# Patient Record
Sex: MALE | URBAN - METROPOLITAN AREA
[De-identification: names, ages, dates, MRNs, and addresses within clinical notes are randomized per-mention and may not be internally consistent; named-entity substitution may affect disease eponyms.]

---

## 2019-01-01 ENCOUNTER — INPATIENT (INPATIENT)
Facility: HOSPITAL | Age: 59
LOS: 1 days | DRG: 64 | End: 2019-04-11
Attending: INTERNAL MEDICINE | Admitting: INTERNAL MEDICINE
Payer: SELF-PAY

## 2019-01-01 VITALS
OXYGEN SATURATION: 100 % | TEMPERATURE: 99 F | DIASTOLIC BLOOD PRESSURE: 87 MMHG | WEIGHT: 130.07 LBS | RESPIRATION RATE: 17 BRPM | SYSTOLIC BLOOD PRESSURE: 154 MMHG | HEART RATE: 122 BPM | HEIGHT: 62 IN

## 2019-01-01 DIAGNOSIS — I61.3 NONTRAUMATIC INTRACEREBRAL HEMORRHAGE IN BRAIN STEM: ICD-10-CM

## 2019-01-01 DIAGNOSIS — I16.1 HYPERTENSIVE EMERGENCY: ICD-10-CM

## 2019-01-01 DIAGNOSIS — Z51.5 ENCOUNTER FOR PALLIATIVE CARE: ICD-10-CM

## 2019-01-01 DIAGNOSIS — N17.9 ACUTE KIDNEY FAILURE, UNSPECIFIED: ICD-10-CM

## 2019-01-01 DIAGNOSIS — R53.81 OTHER MALAISE: ICD-10-CM

## 2019-01-01 DIAGNOSIS — R50.9 FEVER, UNSPECIFIED: ICD-10-CM

## 2019-01-01 DIAGNOSIS — I61.9 NONTRAUMATIC INTRACEREBRAL HEMORRHAGE, UNSPECIFIED: ICD-10-CM

## 2019-01-01 DIAGNOSIS — Z29.9 ENCOUNTER FOR PROPHYLACTIC MEASURES, UNSPECIFIED: ICD-10-CM

## 2019-01-01 DIAGNOSIS — I10 ESSENTIAL (PRIMARY) HYPERTENSION: ICD-10-CM

## 2019-01-01 DIAGNOSIS — J96.01 ACUTE RESPIRATORY FAILURE WITH HYPOXIA: ICD-10-CM

## 2019-01-01 LAB
ALBUMIN SERPL ELPH-MCNC: 3.4 G/DL — LOW (ref 3.5–5)
ALBUMIN SERPL ELPH-MCNC: 3.7 G/DL — SIGNIFICANT CHANGE UP (ref 3.5–5)
ALP SERPL-CCNC: 112 U/L — SIGNIFICANT CHANGE UP (ref 40–120)
ALP SERPL-CCNC: 128 U/L — HIGH (ref 40–120)
ALT FLD-CCNC: 25 U/L DA — SIGNIFICANT CHANGE UP (ref 10–60)
ALT FLD-CCNC: 30 U/L DA — SIGNIFICANT CHANGE UP (ref 10–60)
ANION GAP SERPL CALC-SCNC: 12 MMOL/L — SIGNIFICANT CHANGE UP (ref 5–17)
ANION GAP SERPL CALC-SCNC: 13 MMOL/L — SIGNIFICANT CHANGE UP (ref 5–17)
ANION GAP SERPL CALC-SCNC: 5 MMOL/L — SIGNIFICANT CHANGE UP (ref 5–17)
ANION GAP SERPL CALC-SCNC: 9 MMOL/L — SIGNIFICANT CHANGE UP (ref 5–17)
APTT BLD: 34.6 SEC — SIGNIFICANT CHANGE UP (ref 27.5–36.3)
AST SERPL-CCNC: 46 U/L — HIGH (ref 10–40)
AST SERPL-CCNC: 53 U/L — HIGH (ref 10–40)
BASE EXCESS BLDA CALC-SCNC: 1.7 MMOL/L — SIGNIFICANT CHANGE UP (ref -2–2)
BASOPHILS # BLD AUTO: 0.03 K/UL — SIGNIFICANT CHANGE UP (ref 0–0.2)
BASOPHILS # BLD AUTO: 0.04 K/UL — SIGNIFICANT CHANGE UP (ref 0–0.2)
BASOPHILS NFR BLD AUTO: 0.2 % — SIGNIFICANT CHANGE UP (ref 0–2)
BASOPHILS NFR BLD AUTO: 0.3 % — SIGNIFICANT CHANGE UP (ref 0–2)
BILIRUB DIRECT SERPL-MCNC: 0.1 MG/DL — SIGNIFICANT CHANGE UP (ref 0–0.2)
BILIRUB INDIRECT FLD-MCNC: 0.3 MG/DL — SIGNIFICANT CHANGE UP (ref 0.2–1)
BILIRUB SERPL-MCNC: 0.3 MG/DL — SIGNIFICANT CHANGE UP (ref 0.2–1.2)
BILIRUB SERPL-MCNC: 0.4 MG/DL — SIGNIFICANT CHANGE UP (ref 0.2–1.2)
BLOOD GAS COMMENTS ARTERIAL: SIGNIFICANT CHANGE UP
BUN SERPL-MCNC: 18 MG/DL — SIGNIFICANT CHANGE UP (ref 7–18)
BUN SERPL-MCNC: 19 MG/DL — HIGH (ref 7–18)
BUN SERPL-MCNC: 20 MG/DL — HIGH (ref 7–18)
BUN SERPL-MCNC: 30 MG/DL — HIGH (ref 7–18)
CALCIUM SERPL-MCNC: 7.8 MG/DL — LOW (ref 8.4–10.5)
CALCIUM SERPL-MCNC: 7.9 MG/DL — LOW (ref 8.4–10.5)
CALCIUM SERPL-MCNC: 8.4 MG/DL — SIGNIFICANT CHANGE UP (ref 8.4–10.5)
CALCIUM SERPL-MCNC: 8.4 MG/DL — SIGNIFICANT CHANGE UP (ref 8.4–10.5)
CHLORIDE SERPL-SCNC: 103 MMOL/L — SIGNIFICANT CHANGE UP (ref 96–108)
CHLORIDE SERPL-SCNC: 107 MMOL/L — SIGNIFICANT CHANGE UP (ref 96–108)
CHLORIDE SERPL-SCNC: 111 MMOL/L — HIGH (ref 96–108)
CHLORIDE SERPL-SCNC: 112 MMOL/L — HIGH (ref 96–108)
CK MB BLD-MCNC: 3.2 % — SIGNIFICANT CHANGE UP (ref 0–3.5)
CK MB CFR SERPL CALC: 3.4 NG/ML — SIGNIFICANT CHANGE UP (ref 0–3.6)
CK SERPL-CCNC: 105 U/L — SIGNIFICANT CHANGE UP (ref 35–232)
CO2 SERPL-SCNC: 22 MMOL/L — SIGNIFICANT CHANGE UP (ref 22–31)
CO2 SERPL-SCNC: 22 MMOL/L — SIGNIFICANT CHANGE UP (ref 22–31)
CO2 SERPL-SCNC: 25 MMOL/L — SIGNIFICANT CHANGE UP (ref 22–31)
CO2 SERPL-SCNC: 28 MMOL/L — SIGNIFICANT CHANGE UP (ref 22–31)
CREAT SERPL-MCNC: 1.3 MG/DL — SIGNIFICANT CHANGE UP (ref 0.5–1.3)
CREAT SERPL-MCNC: 1.45 MG/DL — HIGH (ref 0.5–1.3)
CREAT SERPL-MCNC: 1.48 MG/DL — HIGH (ref 0.5–1.3)
CREAT SERPL-MCNC: 1.78 MG/DL — HIGH (ref 0.5–1.3)
EOSINOPHIL # BLD AUTO: 0 K/UL — SIGNIFICANT CHANGE UP (ref 0–0.5)
EOSINOPHIL # BLD AUTO: 0.01 K/UL — SIGNIFICANT CHANGE UP (ref 0–0.5)
EOSINOPHIL NFR BLD AUTO: 0 % — SIGNIFICANT CHANGE UP (ref 0–6)
EOSINOPHIL NFR BLD AUTO: 0.1 % — SIGNIFICANT CHANGE UP (ref 0–6)
GLUCOSE SERPL-MCNC: 120 MG/DL — HIGH (ref 70–99)
GLUCOSE SERPL-MCNC: 166 MG/DL — HIGH (ref 70–99)
GLUCOSE SERPL-MCNC: 190 MG/DL — HIGH (ref 70–99)
GLUCOSE SERPL-MCNC: 324 MG/DL — HIGH (ref 70–99)
HCO3 BLDA-SCNC: 25 MMOL/L — SIGNIFICANT CHANGE UP (ref 23–27)
HCT VFR BLD CALC: 46.9 % — SIGNIFICANT CHANGE UP (ref 39–50)
HCT VFR BLD CALC: 49.5 % — SIGNIFICANT CHANGE UP (ref 39–50)
HCT VFR BLD CALC: 50.9 % — HIGH (ref 39–50)
HCT VFR BLD CALC: 52.1 % — HIGH (ref 39–50)
HGB BLD-MCNC: 14.9 G/DL — SIGNIFICANT CHANGE UP (ref 13–17)
HGB BLD-MCNC: 15 G/DL — SIGNIFICANT CHANGE UP (ref 13–17)
HGB BLD-MCNC: 15.9 G/DL — SIGNIFICANT CHANGE UP (ref 13–17)
HGB BLD-MCNC: 16.6 G/DL — SIGNIFICANT CHANGE UP (ref 13–17)
HOROWITZ INDEX BLDA+IHG-RTO: 50 — SIGNIFICANT CHANGE UP
IMM GRANULOCYTES NFR BLD AUTO: 0.4 % — SIGNIFICANT CHANGE UP (ref 0–1.5)
IMM GRANULOCYTES NFR BLD AUTO: 0.6 % — SIGNIFICANT CHANGE UP (ref 0–1.5)
INR BLD: 0.97 RATIO — SIGNIFICANT CHANGE UP (ref 0.88–1.16)
LACTATE SERPL-SCNC: 2.4 MMOL/L — HIGH (ref 0.7–2)
LYMPHOCYTES # BLD AUTO: 0.84 K/UL — LOW (ref 1–3.3)
LYMPHOCYTES # BLD AUTO: 1.08 K/UL — SIGNIFICANT CHANGE UP (ref 1–3.3)
LYMPHOCYTES # BLD AUTO: 6.7 % — LOW (ref 13–44)
LYMPHOCYTES # BLD AUTO: 7.9 % — LOW (ref 13–44)
MAGNESIUM SERPL-MCNC: 2.4 MG/DL — SIGNIFICANT CHANGE UP (ref 1.6–2.6)
MAGNESIUM SERPL-MCNC: 2.5 MG/DL — SIGNIFICANT CHANGE UP (ref 1.6–2.6)
MCHC RBC-ENTMCNC: 25.7 PG — LOW (ref 27–34)
MCHC RBC-ENTMCNC: 25.9 PG — LOW (ref 27–34)
MCHC RBC-ENTMCNC: 30.1 GM/DL — LOW (ref 32–36)
MCHC RBC-ENTMCNC: 31.2 GM/DL — LOW (ref 32–36)
MCHC RBC-ENTMCNC: 31.9 GM/DL — LOW (ref 32–36)
MCHC RBC-ENTMCNC: 32 GM/DL — SIGNIFICANT CHANGE UP (ref 32–36)
MCV RBC AUTO: 81 FL — SIGNIFICANT CHANGE UP (ref 80–100)
MCV RBC AUTO: 81.2 FL — SIGNIFICANT CHANGE UP (ref 80–100)
MCV RBC AUTO: 82.4 FL — SIGNIFICANT CHANGE UP (ref 80–100)
MCV RBC AUTO: 86.1 FL — SIGNIFICANT CHANGE UP (ref 80–100)
MONOCYTES # BLD AUTO: 0.98 K/UL — HIGH (ref 0–0.9)
MONOCYTES # BLD AUTO: 1.43 K/UL — HIGH (ref 0–0.9)
MONOCYTES NFR BLD AUTO: 10.5 % — SIGNIFICANT CHANGE UP (ref 2–14)
MONOCYTES NFR BLD AUTO: 7.8 % — SIGNIFICANT CHANGE UP (ref 2–14)
NEUTROPHILS # BLD AUTO: 10.58 K/UL — HIGH (ref 1.8–7.4)
NEUTROPHILS # BLD AUTO: 11 K/UL — HIGH (ref 1.8–7.4)
NEUTROPHILS NFR BLD AUTO: 80.8 % — HIGH (ref 43–77)
NEUTROPHILS NFR BLD AUTO: 84.7 % — HIGH (ref 43–77)
NRBC # BLD: 0 /100 WBCS — SIGNIFICANT CHANGE UP (ref 0–0)
PCO2 BLDA: 39 MMHG — SIGNIFICANT CHANGE UP (ref 32–46)
PCP SPEC-MCNC: SIGNIFICANT CHANGE UP
PH BLDA: 7.43 — SIGNIFICANT CHANGE UP (ref 7.35–7.45)
PHOSPHATE SERPL-MCNC: 3.1 MG/DL — SIGNIFICANT CHANGE UP (ref 2.5–4.5)
PHOSPHATE SERPL-MCNC: 4.4 MG/DL — SIGNIFICANT CHANGE UP (ref 2.5–4.5)
PLATELET # BLD AUTO: 246 K/UL — SIGNIFICANT CHANGE UP (ref 150–400)
PLATELET # BLD AUTO: 253 K/UL — SIGNIFICANT CHANGE UP (ref 150–400)
PLATELET # BLD AUTO: 253 K/UL — SIGNIFICANT CHANGE UP (ref 150–400)
PLATELET # BLD AUTO: 264 K/UL — SIGNIFICANT CHANGE UP (ref 150–400)
PO2 BLDA: 151 MMHG — HIGH (ref 74–108)
POTASSIUM SERPL-MCNC: 3.6 MMOL/L — SIGNIFICANT CHANGE UP (ref 3.5–5.3)
POTASSIUM SERPL-MCNC: 3.6 MMOL/L — SIGNIFICANT CHANGE UP (ref 3.5–5.3)
POTASSIUM SERPL-MCNC: 3.7 MMOL/L — SIGNIFICANT CHANGE UP (ref 3.5–5.3)
POTASSIUM SERPL-MCNC: 3.7 MMOL/L — SIGNIFICANT CHANGE UP (ref 3.5–5.3)
POTASSIUM SERPL-SCNC: 3.6 MMOL/L — SIGNIFICANT CHANGE UP (ref 3.5–5.3)
POTASSIUM SERPL-SCNC: 3.6 MMOL/L — SIGNIFICANT CHANGE UP (ref 3.5–5.3)
POTASSIUM SERPL-SCNC: 3.7 MMOL/L — SIGNIFICANT CHANGE UP (ref 3.5–5.3)
POTASSIUM SERPL-SCNC: 3.7 MMOL/L — SIGNIFICANT CHANGE UP (ref 3.5–5.3)
PROT SERPL-MCNC: 7.1 G/DL — SIGNIFICANT CHANGE UP (ref 6–8.3)
PROT SERPL-MCNC: 7.5 G/DL — SIGNIFICANT CHANGE UP (ref 6–8.3)
PROTHROM AB SERPL-ACNC: 10.7 SEC — SIGNIFICANT CHANGE UP (ref 10–12.9)
RBC # BLD: 5.75 M/UL — SIGNIFICANT CHANGE UP (ref 4.2–5.8)
RBC # BLD: 5.79 M/UL — SIGNIFICANT CHANGE UP (ref 4.2–5.8)
RBC # BLD: 6.18 M/UL — HIGH (ref 4.2–5.8)
RBC # BLD: 6.42 M/UL — HIGH (ref 4.2–5.8)
RBC # FLD: 13.2 % — SIGNIFICANT CHANGE UP (ref 10.3–14.5)
RBC # FLD: 13.2 % — SIGNIFICANT CHANGE UP (ref 10.3–14.5)
RBC # FLD: 13.4 % — SIGNIFICANT CHANGE UP (ref 10.3–14.5)
RBC # FLD: 13.7 % — SIGNIFICANT CHANGE UP (ref 10.3–14.5)
SAO2 % BLDA: 99 % — HIGH (ref 92–96)
SODIUM SERPL-SCNC: 138 MMOL/L — SIGNIFICANT CHANGE UP (ref 135–145)
SODIUM SERPL-SCNC: 141 MMOL/L — SIGNIFICANT CHANGE UP (ref 135–145)
SODIUM SERPL-SCNC: 144 MMOL/L — SIGNIFICANT CHANGE UP (ref 135–145)
SODIUM SERPL-SCNC: 146 MMOL/L — HIGH (ref 135–145)
TROPONIN I SERPL-MCNC: 0.07 NG/ML — HIGH (ref 0–0.04)
TROPONIN I SERPL-MCNC: 0.56 NG/ML — HIGH (ref 0–0.04)
WBC # BLD: 10.22 K/UL — SIGNIFICANT CHANGE UP (ref 3.8–10.5)
WBC # BLD: 12.5 K/UL — HIGH (ref 3.8–10.5)
WBC # BLD: 13.62 K/UL — HIGH (ref 3.8–10.5)
WBC # BLD: 14.06 K/UL — HIGH (ref 3.8–10.5)
WBC # FLD AUTO: 10.22 K/UL — SIGNIFICANT CHANGE UP (ref 3.8–10.5)
WBC # FLD AUTO: 12.5 K/UL — HIGH (ref 3.8–10.5)
WBC # FLD AUTO: 13.62 K/UL — HIGH (ref 3.8–10.5)
WBC # FLD AUTO: 14.06 K/UL — HIGH (ref 3.8–10.5)

## 2019-01-01 PROCEDURE — 80053 COMPREHEN METABOLIC PANEL: CPT

## 2019-01-01 PROCEDURE — 85730 THROMBOPLASTIN TIME PARTIAL: CPT

## 2019-01-01 PROCEDURE — 82553 CREATINE MB FRACTION: CPT

## 2019-01-01 PROCEDURE — 85610 PROTHROMBIN TIME: CPT

## 2019-01-01 PROCEDURE — 99291 CRITICAL CARE FIRST HOUR: CPT

## 2019-01-01 PROCEDURE — 83735 ASSAY OF MAGNESIUM: CPT

## 2019-01-01 PROCEDURE — 94003 VENT MGMT INPAT SUBQ DAY: CPT

## 2019-01-01 PROCEDURE — 85027 COMPLETE CBC AUTOMATED: CPT

## 2019-01-01 PROCEDURE — 80076 HEPATIC FUNCTION PANEL: CPT

## 2019-01-01 PROCEDURE — 70450 CT HEAD/BRAIN W/O DYE: CPT | Mod: 26

## 2019-01-01 PROCEDURE — 93005 ELECTROCARDIOGRAM TRACING: CPT

## 2019-01-01 PROCEDURE — 96374 THER/PROPH/DIAG INJ IV PUSH: CPT | Mod: XU

## 2019-01-01 PROCEDURE — 80048 BASIC METABOLIC PNL TOTAL CA: CPT

## 2019-01-01 PROCEDURE — 84100 ASSAY OF PHOSPHORUS: CPT

## 2019-01-01 PROCEDURE — 99291 CRITICAL CARE FIRST HOUR: CPT | Mod: 25

## 2019-01-01 PROCEDURE — 94760 N-INVAS EAR/PLS OXIMETRY 1: CPT

## 2019-01-01 PROCEDURE — 82803 BLOOD GASES ANY COMBINATION: CPT

## 2019-01-01 PROCEDURE — 82962 GLUCOSE BLOOD TEST: CPT

## 2019-01-01 PROCEDURE — 94002 VENT MGMT INPAT INIT DAY: CPT

## 2019-01-01 PROCEDURE — 71045 X-RAY EXAM CHEST 1 VIEW: CPT | Mod: 26

## 2019-01-01 PROCEDURE — 83605 ASSAY OF LACTIC ACID: CPT

## 2019-01-01 PROCEDURE — 99223 1ST HOSP IP/OBS HIGH 75: CPT

## 2019-01-01 PROCEDURE — 70450 CT HEAD/BRAIN W/O DYE: CPT

## 2019-01-01 PROCEDURE — 36415 COLL VENOUS BLD VENIPUNCTURE: CPT

## 2019-01-01 PROCEDURE — 71045 X-RAY EXAM CHEST 1 VIEW: CPT

## 2019-01-01 PROCEDURE — 99497 ADVNCD CARE PLAN 30 MIN: CPT | Mod: 25

## 2019-01-01 PROCEDURE — 82550 ASSAY OF CK (CPK): CPT

## 2019-01-01 PROCEDURE — 84484 ASSAY OF TROPONIN QUANT: CPT

## 2019-01-01 PROCEDURE — 80307 DRUG TEST PRSMV CHEM ANLYZR: CPT

## 2019-01-01 RX ORDER — MORPHINE SULFATE 50 MG/1
2 CAPSULE, EXTENDED RELEASE ORAL EVERY 4 HOURS
Qty: 0 | Refills: 0 | Status: DISCONTINUED | OUTPATIENT
Start: 2019-01-01 | End: 2019-01-01

## 2019-01-01 RX ORDER — DEXTROSE 50 % IN WATER 50 %
25 SYRINGE (ML) INTRAVENOUS ONCE
Qty: 0 | Refills: 0 | Status: DISCONTINUED | OUTPATIENT
Start: 2019-01-01 | End: 2019-01-01

## 2019-01-01 RX ORDER — SODIUM CHLORIDE 9 MG/ML
1000 INJECTION, SOLUTION INTRAVENOUS
Qty: 0 | Refills: 0 | Status: DISCONTINUED | OUTPATIENT
Start: 2019-01-01 | End: 2019-01-01

## 2019-01-01 RX ORDER — ROBINUL 0.2 MG/ML
0.2 INJECTION INTRAMUSCULAR; INTRAVENOUS EVERY 6 HOURS
Qty: 0 | Refills: 0 | Status: DISCONTINUED | OUTPATIENT
Start: 2019-01-01 | End: 2019-01-01

## 2019-01-01 RX ORDER — ACETAMINOPHEN 500 MG
2 TABLET ORAL
Qty: 20 | Refills: 0 | OUTPATIENT
Start: 2019-01-01

## 2019-01-01 RX ORDER — ACETAMINOPHEN 500 MG
1000 TABLET ORAL ONCE
Qty: 0 | Refills: 0 | Status: COMPLETED | OUTPATIENT
Start: 2019-01-01 | End: 2019-01-01

## 2019-01-01 RX ORDER — GLUCAGON INJECTION, SOLUTION 0.5 MG/.1ML
1 INJECTION, SOLUTION SUBCUTANEOUS ONCE
Qty: 0 | Refills: 0 | Status: DISCONTINUED | OUTPATIENT
Start: 2019-01-01 | End: 2019-01-01

## 2019-01-01 RX ORDER — CHLORHEXIDINE GLUCONATE 213 G/1000ML
1 SOLUTION TOPICAL
Qty: 0 | Refills: 0 | Status: DISCONTINUED | OUTPATIENT
Start: 2019-01-01 | End: 2019-01-01

## 2019-01-01 RX ORDER — DEXTROSE 50 % IN WATER 50 %
15 SYRINGE (ML) INTRAVENOUS ONCE
Qty: 0 | Refills: 0 | Status: DISCONTINUED | OUTPATIENT
Start: 2019-01-01 | End: 2019-01-01

## 2019-01-01 RX ORDER — MORPHINE SULFATE 50 MG/1
2 CAPSULE, EXTENDED RELEASE ORAL
Qty: 0 | Refills: 0 | Status: DISCONTINUED | OUTPATIENT
Start: 2019-01-01 | End: 2019-01-01

## 2019-01-01 RX ORDER — LISINOPRIL 2.5 MG/1
0 TABLET ORAL
Qty: 0 | Refills: 0 | COMMUNITY

## 2019-01-01 RX ORDER — FENTANYL CITRATE 50 UG/ML
50 INJECTION INTRAVENOUS ONCE
Qty: 0 | Refills: 0 | Status: DISCONTINUED | OUTPATIENT
Start: 2019-01-01 | End: 2019-01-01

## 2019-01-01 RX ORDER — ROBINUL 0.2 MG/ML
0.4 INJECTION INTRAMUSCULAR; INTRAVENOUS EVERY 6 HOURS
Qty: 0 | Refills: 0 | Status: DISCONTINUED | OUTPATIENT
Start: 2019-01-01 | End: 2019-01-01

## 2019-01-01 RX ORDER — FENTANYL CITRATE 50 UG/ML
50 INJECTION INTRAVENOUS
Qty: 0 | Refills: 0 | Status: DISCONTINUED | OUTPATIENT
Start: 2019-01-01 | End: 2019-01-01

## 2019-01-01 RX ORDER — FENTANYL CITRATE 50 UG/ML
100 INJECTION INTRAVENOUS ONCE
Qty: 0 | Refills: 0 | Status: DISCONTINUED | OUTPATIENT
Start: 2019-01-01 | End: 2019-01-01

## 2019-01-01 RX ORDER — INSULIN LISPRO 100/ML
VIAL (ML) SUBCUTANEOUS EVERY 6 HOURS
Qty: 0 | Refills: 0 | Status: DISCONTINUED | OUTPATIENT
Start: 2019-01-01 | End: 2019-01-01

## 2019-01-01 RX ORDER — DOCUSATE SODIUM 100 MG
0 CAPSULE ORAL
Qty: 0 | Refills: 0 | COMMUNITY

## 2019-01-01 RX ORDER — NICARDIPINE HYDROCHLORIDE 30 MG/1
5 CAPSULE, EXTENDED RELEASE ORAL
Qty: 40 | Refills: 0 | Status: DISCONTINUED | OUTPATIENT
Start: 2019-01-01 | End: 2019-01-01

## 2019-01-01 RX ORDER — LEVETIRACETAM 250 MG/1
1000 TABLET, FILM COATED ORAL ONCE
Qty: 0 | Refills: 0 | Status: COMPLETED | OUTPATIENT
Start: 2019-01-01 | End: 2019-01-01

## 2019-01-01 RX ORDER — PANTOPRAZOLE SODIUM 20 MG/1
40 TABLET, DELAYED RELEASE ORAL DAILY
Qty: 0 | Refills: 0 | Status: DISCONTINUED | OUTPATIENT
Start: 2019-01-01 | End: 2019-01-01

## 2019-01-01 RX ORDER — DEXTROSE 50 % IN WATER 50 %
12.5 SYRINGE (ML) INTRAVENOUS ONCE
Qty: 0 | Refills: 0 | Status: DISCONTINUED | OUTPATIENT
Start: 2019-01-01 | End: 2019-01-01

## 2019-01-01 RX ORDER — ACETAMINOPHEN 500 MG
1000 TABLET ORAL ONCE
Qty: 0 | Refills: 0 | Status: DISCONTINUED | OUTPATIENT
Start: 2019-01-01 | End: 2019-01-01

## 2019-01-01 RX ORDER — ACETAMINOPHEN 500 MG
650 TABLET ORAL EVERY 6 HOURS
Qty: 0 | Refills: 0 | Status: DISCONTINUED | OUTPATIENT
Start: 2019-01-01 | End: 2019-01-01

## 2019-01-01 RX ORDER — ASPIRIN/CALCIUM CARB/MAGNESIUM 324 MG
0 TABLET ORAL
Qty: 0 | Refills: 0 | COMMUNITY

## 2019-01-01 RX ORDER — OMEPRAZOLE 10 MG/1
0 CAPSULE, DELAYED RELEASE ORAL
Qty: 0 | Refills: 0 | COMMUNITY

## 2019-01-01 RX ADMIN — FENTANYL CITRATE 50 MICROGRAM(S): 50 INJECTION INTRAVENOUS at 09:35

## 2019-01-01 RX ADMIN — FENTANYL CITRATE 50 MICROGRAM(S): 50 INJECTION INTRAVENOUS at 13:15

## 2019-01-01 RX ADMIN — FENTANYL CITRATE 50 MICROGRAM(S): 50 INJECTION INTRAVENOUS at 00:25

## 2019-01-01 RX ADMIN — FENTANYL CITRATE 50 MICROGRAM(S): 50 INJECTION INTRAVENOUS at 15:27

## 2019-01-01 RX ADMIN — SODIUM CHLORIDE 30 MILLILITER(S): 9 INJECTION, SOLUTION INTRAVENOUS at 02:04

## 2019-01-01 RX ADMIN — CHLORHEXIDINE GLUCONATE 1 APPLICATION(S): 213 SOLUTION TOPICAL at 18:27

## 2019-01-01 RX ADMIN — LEVETIRACETAM 400 MILLIGRAM(S): 250 TABLET, FILM COATED ORAL at 02:37

## 2019-01-01 RX ADMIN — NICARDIPINE HYDROCHLORIDE 25 MG/HR: 30 CAPSULE, EXTENDED RELEASE ORAL at 11:00

## 2019-01-01 RX ADMIN — FENTANYL CITRATE 50 MICROGRAM(S): 50 INJECTION INTRAVENOUS at 00:08

## 2019-01-01 RX ADMIN — MORPHINE SULFATE 2 MILLIGRAM(S): 50 CAPSULE, EXTENDED RELEASE ORAL at 13:46

## 2019-01-01 RX ADMIN — Medication 400 MILLIGRAM(S): at 21:32

## 2019-01-01 RX ADMIN — FENTANYL CITRATE 50 MICROGRAM(S): 50 INJECTION INTRAVENOUS at 14:57

## 2019-01-01 RX ADMIN — FENTANYL CITRATE 100 MICROGRAM(S): 50 INJECTION INTRAVENOUS at 18:37

## 2019-01-01 RX ADMIN — ROBINUL 0.2 MILLIGRAM(S): 0.2 INJECTION INTRAMUSCULAR; INTRAVENOUS at 13:45

## 2019-01-01 RX ADMIN — Medication 1: at 06:09

## 2019-01-01 RX ADMIN — MORPHINE SULFATE 2 MILLIGRAM(S): 50 CAPSULE, EXTENDED RELEASE ORAL at 13:18

## 2019-01-01 RX ADMIN — Medication 400 MILLIGRAM(S): at 07:16

## 2019-01-01 RX ADMIN — Medication 1000 MILLIGRAM(S): at 07:30

## 2019-01-01 RX ADMIN — NICARDIPINE HYDROCHLORIDE 25 MG/HR: 30 CAPSULE, EXTENDED RELEASE ORAL at 02:21

## 2019-01-01 RX ADMIN — FENTANYL CITRATE 50 MICROGRAM(S): 50 INJECTION INTRAVENOUS at 09:05

## 2019-01-01 RX ADMIN — Medication 1000 MILLIGRAM(S): at 22:15

## 2019-01-01 RX ADMIN — PANTOPRAZOLE SODIUM 40 MILLIGRAM(S): 20 TABLET, DELAYED RELEASE ORAL at 13:05

## 2019-01-01 RX ADMIN — FENTANYL CITRATE 50 MICROGRAM(S): 50 INJECTION INTRAVENOUS at 12:38

## 2019-01-01 RX ADMIN — NICARDIPINE HYDROCHLORIDE 25 MG/HR: 30 CAPSULE, EXTENDED RELEASE ORAL at 00:00

## 2019-01-01 RX ADMIN — CHLORHEXIDINE GLUCONATE 1 APPLICATION(S): 213 SOLUTION TOPICAL at 06:03

## 2019-01-01 RX ADMIN — CHLORHEXIDINE GLUCONATE 1 APPLICATION(S): 213 SOLUTION TOPICAL at 17:22

## 2019-01-01 RX ADMIN — CHLORHEXIDINE GLUCONATE 1 APPLICATION(S): 213 SOLUTION TOPICAL at 06:33

## 2019-04-09 NOTE — PROGRESS NOTE ADULT - PROBLEM SELECTOR PLAN 1
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   - Family leaning towards withdrawal of care, needs time to discuss with other family members  - no asa / blood thinners  - will monitor BP.

## 2019-04-09 NOTE — H&P ADULT - ATTENDING COMMENTS
Patient seen and examined at bedside with resident in the ED upon consultation request at 1.50AM.  This is 59 year old man with uncontrolled HTN found with unresponsiveness and intubated in the field and CT brain in the ED showed pontine/brainstem bleed. He was started on nicardipine for BP control. Informed partner at bedside of poor prognosis.  VS: 's/90's mmHg (on cardene drip), middle aged man orally intubated, unresponsive, intermittent jerky movements of lower extremities, pupils dilated and fixed, no corneal or gag reflexes, +cough, Lungs clear,   LABS reviewed. CT brain showed massive brainstem bleed.  A/P  Massive pontine/brainstem bleed with impending herniation with acute respiratory failure s/p intubation.  Hypertensive emergency.   - Continue mechanical ventilation  -Continue Nicardipine drip to keep SBP ~140mmHg.  -Start 3% saline to keep serum Na 145-150.  -Neurosurgery contacted by ED physician and no intervention, poor prognosis.  -[facility] to be contacted.  -Palliative care consult.  -DVT prophylaxis with venodynes, no lovenox in view of ICH.  -GI prophylaxis.  -D/w patient's partner at bedside and informed her of grave prognosis.   -Accepted to ICU.

## 2019-04-09 NOTE — CONSULT NOTE ADULT - SUBJECTIVE AND OBJECTIVE BOX
Patient is a 59y old  Male who presents with a chief complaint of unresponsiveness (09 Apr 2019 08:02)      HPI:  59  year  old male with PMH of HTN , GERD presented to  ED brought by  911 after being intubated in the [facility] for unresponsiveness and agonal  breathing . patient partner at  the bedside provides the history . patient was watching the basket ball  game and all  of a sudden started moaning and then lost consiousness . patient non complaint with  b. 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 Smaller foci of right   cerebellar hemorrhage. Prominent temporal horns. Early hydrocephalus   cannot be excluded. (09 Apr 2019 03:23)         Neurological Review of Systems:  No difficulty with language.  No vision loss or double vision.  No dizziness, vertigo or new hearing loss.  No difficulty with speech or swallowing.  No focal weakness.  No focal sensory changes.  No numbness or tingling in the bilateral lower extremities.  No difficulty with balance.  No difficulty with ambulation.        MEDICATIONS  (STANDING):  dextrose 50% Injectable 25 Gram(s) IV Push once  insulin lispro (HumaLOG) corrective regimen sliding scale   SubCutaneous every 6 hours  niCARdipine Infusion 5 mG/Hr (25 mL/Hr) IV Continuous <Continuous>  pantoprazole  Injectable 40 milliGRAM(s) IV Push daily    MEDICATIONS  (PRN):  glucagon  Injectable 1 milliGRAM(s) IntraMuscular once PRN Glucose <70 milliGRAM(s)/deciLiter    Allergies    No Known Allergies    Intolerances      PAST MEDICAL & SURGICAL HISTORY:  GERD (gastroesophageal reflux disease)  HTN (hypertension)  No significant past surgical history    FAMILY HISTORY:    SOCIAL HISTORY: non smoker/ former smoker/ active smoker    Review of Systems:  Constitutional: No generalized weakness. No fevers or chills.                    Eyes, Ears, Mouth, Throat: No vision loss   Respiratory: No shortness of breath or cough.                                Cardiovascular: No chest pain or palpitations  Gastrointestinal: No nausea or vomiting.                                         Genitourinary: No urinary incontinence or burning on urination.  Musculoskeletal: No joint pain.                                                           Dermatologic: No rash.  Neurological: as per HPI                                                                      Psychiatric: No behavioral problems.  Endocrine: No known hypoglycemia.               Hematologic/Lymphatic: No easy bleeding.    O:  Vital Signs Last 24 Hrs  T(C): 39.7 (09 Apr 2019 07:30), Max: 39.7 (09 Apr 2019 07:30)  T(F): 103.4 (09 Apr 2019 07:30), Max: 103.4 (09 Apr 2019 07:30)  HR: 117 (09 Apr 2019 08:00) (101 - 122)  BP: 145/86 (09 Apr 2019 08:00) (123/68 - 185/110)  BP(mean): 99 (09 Apr 2019 08:00) (86 - 102)  RR: 28 (09 Apr 2019 08:00) (14 - 28)  SpO2: 99% (09 Apr 2019 08:00) (99% - 100%)    General Exam:   General appearance: No acute distress                 Cardiovascular: Pedal dorsalis pulses intact bilaterally    Mental Status: Orientated to self, date and place.  Attention intact.  No dysarthria, aphasia or neglect.  Knowledge intact.  Registration intact.  Short and long term memory grossly intact.      Cranial Nerves: CN I - not tested.  PERRL, EOMI, VFF, no nystagmus or diplopia.  No APD.  Fundi not visualized.  CN V1-3 intact to light touch and pinprick.  No facial asymmetry.  Hearing intact to finger rub bilaterally.  Tongue, uvula and palate midline.  Sternocleidomastoid and Trapezius intact bilaterally.    Motor:   Tone: normal.                  Strength intact throughout  No pronator drift bilaterally                      No dysmetria on finger-nose-finger or heel-shin-heel  No truncal ataxia.  No resting, postural or action tremor.  No myoclonus.    Sensation: intact to light touch, pinprick, vibration and proprioception    Deep Tendon Reflexes: 1+ bilateral biceps, triceps, brachioradialis, knee and ankle  Toes flexor bilaterally    Gait: normal and stable.  Rhomberg -sanjana.    Other:     LABS:                        15.0   14.06 )-----------( 253      ( 09 Apr 2019 05:17 )             46.9     04-09    141  |  107  |  20<H>  ----------------------------<  166<H>  3.6   |  25  |  1.48<H>    Ca    7.9<L>      09 Apr 2019 05:17    TPro  7.5  /  Alb  3.7  /  TBili  0.4  /  DBili  0.1  /  AST  46<H>  /  ALT  30  /  AlkPhos  112  04-09    PT/INR - ( 09 Apr 2019 01:23 )   PT: 10.7 sec;   INR: 0.97 ratio         PTT - ( 09 Apr 2019 01:23 )  PTT:34.6 sec        RADIOLOGY & ADDITIONAL STUDIES:    EKG:   < from: CT Head No Cont (04.09.19 @ 01:35) > (images reviwed)  Impression:     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 Smaller foci of right   cerebellar hemorrhage. Prominent temporal horns. Early hydrocephalus   cannot be excluded.    < end of copied text >    ED note appreciated, patient not accepted for transfer as though to not be salvagable Patient is a 59y old  Male who presents with a chief complaint of unresponsiveness (09 Apr 2019 08:02)  hx from [facility]      HPI:  59  year  old male with PMH of HTN , GERD presented to  ED brought by  911 after being intubated in the [facility] for unresponsiveness and agonal  breathing.  As per chart, patient was watching the basket ball  game and all  of a sudden started moaning and then lost consiousness.  The patient has been non complaint with  b. p medications.  In the E.D patient vitals are b.p  154/87 and repeat  was 201/108  and hr-122 and intubated.  cxr :  clear  lungs ET tube in place.     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 Smaller foci of right   cerebellar hemorrhage. Prominent temporal horns. Early hydrocephalus   cannot be excluded. (09 Apr 2019 03:23)    Neuro ICU was contacted for transfer but transfer was declined as patient was though to be "non salvageable          MEDICATIONS  (STANDING):  dextrose 50% Injectable 25 Gram(s) IV Push once  insulin lispro (HumaLOG) corrective regimen sliding scale   SubCutaneous every 6 hours  niCARdipine Infusion 5 mG/Hr (25 mL/Hr) IV Continuous <Continuous>  pantoprazole  Injectable 40 milliGRAM(s) IV Push daily    MEDICATIONS  (PRN):  glucagon  Injectable 1 milliGRAM(s) IntraMuscular once PRN Glucose <70 milliGRAM(s)/deciLiter    Allergies    No Known Allergies    Intolerances      PAST MEDICAL & SURGICAL HISTORY:  GERD (gastroesophageal reflux disease)  HTN (hypertension)  No significant past surgical history    FAMILY HISTORY: uknown    SOCIAL HISTORY: unknown    Review of Systems:  Constitutional: + fevers.                    Respiratory: + intubated                               Gastrointestinal: No vomiting.         O:  Vital Signs Last 24 Hrs  T(C): 39.7 (09 Apr 2019 07:30), Max: 39.7 (09 Apr 2019 07:30)  T(F): 103.4 (09 Apr 2019 07:30), Max: 103.4 (09 Apr 2019 07:30)  HR: 117 (09 Apr 2019 08:00) (101 - 122)  BP: 145/86 (09 Apr 2019 08:00) (123/68 - 185/110)  BP(mean): 99 (09 Apr 2019 08:00) (86 - 102)  RR: 28 (09 Apr 2019 08:00) (14 - 28)  SpO2: 99% (09 Apr 2019 08:00) (99% - 100%)    General Exam:   General appearance: No acute distress                 Cardiovascular: Pedal dorsalis pulses intact bilaterally  intubated    Mental Status: Not Orientated to self, date and place.  No response to name, light  touch or deep sternal rub.  Non verbal.  Does not follow requests.    Cranial Nerves: CN I - not tested.  pupils 6mm-> NR bl, midline. No blinks reflex.  No occulocephalic.  Fundi not visualized.   No gross facial asymmetry, patient intubated.      Motor:   Tone: normal.                  Strength: no active or passive withdraw  + decorticate posturing    Sensation: no response to deep pain in all 4 ext    Deep Tendon Reflexes: 1+ bilateral biceps, triceps, brachioradialis, knee and ankle  Toes flexor on left and extensor on right    Gait: deferred    Other:     LABS:                        15.0   14.06 )-----------( 253      ( 09 Apr 2019 05:17 )             46.9     04-09    141  |  107  |  20<H>  ----------------------------<  166<H>  3.6   |  25  |  1.48<H>    Ca    7.9<L>      09 Apr 2019 05:17    TPro  7.5  /  Alb  3.7  /  TBili  0.4  /  DBili  0.1  /  AST  46<H>  /  ALT  30  /  AlkPhos  112  04-09    PT/INR - ( 09 Apr 2019 01:23 )   PT: 10.7 sec;   INR: 0.97 ratio         PTT - ( 09 Apr 2019 01:23 )  PTT:34.6 sec        RADIOLOGY & ADDITIONAL STUDIES:    EKG:    < from: CT Head No Cont (04.09.19 @ 01:35) > (images reviwed)  Impression:     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 Smaller foci of right   cerebellar hemorrhage. Prominent temporal horns. Early hydrocephalus   cannot be excluded.    < end of copied text >    ED note appreciated, patient not accepted for transfer as though to not be salvagable

## 2019-04-09 NOTE — PROGRESS NOTE ADULT - PROBLEM SELECTOR PLAN 2
- patient non complaint with b.p  asper family   -currently on nicardipine drip BP under 140 now  c/w nicardipine drip   Trop elevated : likely from uncontrolled HTN ,  will monitor.

## 2019-04-09 NOTE — H&P ADULT - HISTORY OF PRESENT ILLNESS
59  year  old male with PMH of HTN , GERD presented to  ED brought by  911 after being intubated in the [facility] for unresponsiveness and agonal  breathing . patient partner at  the bedside provides the history . patient was watching the basket ball  game and all  of a sudden started moaning and then lost consiousness . patient non complaint with  b. 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 Smaller foci of right   cerebellar hemorrhage. Prominent temporal horns. Early hydrocephalus   cannot be excluded.

## 2019-04-09 NOTE — CONSULT NOTE ADULT - ASSESSMENT
59  year  old male with PMH of HTN , GERD presented to  ED brought by  911 after being intubated in the [facility] for unresponsiveness and agonal  breathing . patient partner at  the bedside provides the history . patient was watching the basket ball  game and all  of a sudden started moaning and then lost consiousness . patient non complaint with  b. 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 Smaller foci of right   cerebellar hemorrhage. Prominent temporal horns. Early hydrocephalus   cannot be excluded.

## 2019-04-09 NOTE — CONSULT NOTE ADULT - SUBJECTIVE AND OBJECTIVE BOX
HPI:  59  year  old male with PMH of HTN , GERD presented to  ED brought by  911 after being intubated in the [facility] for unresponsiveness and agonal  breathing . patient partner at  the bedside provides the history . patient was watching the basket ball  game and all  of a sudden started moaning and then lost consiousness . patient non complaint with  b. p medications as per the partner at the bedside and was completely normal and had no complaints of chest  pain , shortness of breath  or any  acute symptoms ,    IN the E.D patient vitals are b.p  154/87 and repeat  was 201/108  and hr-122 and intubated. CT head indicates: acute intraparenchymal pontine hemorrhage with surrounding edema, extending to the right brachium pontis and fourth ventricle and effacing the prepontine and quadrigeminal cisterns. Smaller foci of right cerebellar hemorrhage. Prominent temporal horns. Early hydrocephalus cannot be excluded. (09 Apr 2019 03:23)    Patient seen in ICU - remains intubated; grave prognosis. Request to establish goals of care with family. DNR on file.     PAST MEDICAL & SURGICAL HISTORY:  GERD (gastroesophageal reflux disease)  HTN (hypertension)  No significant past surgical history    SOCIAL HISTORY:    Admitted from:  home, lives with partner x 19 years  Substance abuse history: +amphetamine noted in urine             Tobacco hx:    0.5 ppd/day             Alcohol hx:   social           Home Opioid hx: none  [employer]:     Orthodoxy                               Preferred Language: english    Surrogate/HCP/Guardian: Jadyn Bill (partner/surrogate): [phone]    FAMILY HISTORY:  No significant family hx reported.     Baseline ADLs (prior to admission): independent, alert/oriented    No Known Allergies    Present Symptoms:       Review of Systems:  Unable to obtain due to poor mentation - patient intubated; minimally responsive to pain, unable to follow commands    MEDICATIONS  (STANDING):  dextrose 50% Injectable 25 Gram(s) IV Push once  insulin lispro (HumaLOG) corrective regimen sliding scale   SubCutaneous every 6 hours  niCARdipine Infusion 5 mG/Hr (25 mL/Hr) IV Continuous <Continuous>  pantoprazole  Injectable 40 milliGRAM(s) IV Push daily    MEDICATIONS  (PRN):  acetaminophen  IVPB .. 1000 milliGRAM(s) IV Intermittent once PRN Temp greater or equal to 38C (100.4F)  glucagon  Injectable 1 milliGRAM(s) IntraMuscular once PRN Glucose <70 milliGRAM(s)/deciLiter      PHYSICAL EXAM:    Vital Signs Last 24 Hrs  T(C): 39.7 (09 Apr 2019 07:30), Max: 39.7 (09 Apr 2019 07:30)  T(F): 103.4 (09 Apr 2019 07:30), Max: 103.4 (09 Apr 2019 07:30)  HR: 106 (09 Apr 2019 12:30) (97 - 122)  BP: 127/80 (09 Apr 2019 12:30) (123/68 - 185/110)  BP(mean): 92 (09 Apr 2019 12:30) (85 - 111)  RR: 17 (09 Apr 2019 12:30) (13 - 37)  SpO2: 99% (09 Apr 2019 12:30) (99% - 100%)    General: patient intubated, no sedation, minimally responsive to pain  Karnofsky Performance Score/Palliative Performance Status Version2:    10 %    HEENT:  dry lips, ET tube. Pupils fixed and dilated; + gag reflex   Lungs: comfortable, on vent. No signs of respiratory distress.   CV:  tachycardia  GI: incontinent  :  mc  Musculoskeletal: now bedbound, dependent on all ADLs, unable to follow commands;  decorticate posture   Skin: normal     Neuro:   cognitive impairment, patient on no sedation, but minimally responsive to pain, unable to follow commands, fixed pupils  Oral intake ability: unable/only mouth care    Diet: NPO    LABS:                        15.0   14.06 )-----------( 253      ( 09 Apr 2019 05:17 )             46.9     04-09    141  |  107  |  20<H>  ----------------------------<  166<H>  3.6   |  25  |  1.48<H>    Ca    7.9<L>      09 Apr 2019 05:17    TPro  7.5  /  Alb  3.7  /  TBili  0.4  /  DBili  0.1  /  AST  46<H>  /  ALT  30  /  AlkPhos  112  04-09        RADIOLOGY & ADDITIONAL STUDIES:  < from: CT Head No Cont (04.09.19 @ 01:35) >  EXAM:  CT BRAIN                            PROCEDURE DATE:  04/09/2019          INTERPRETATION:  Clinical indication: Unresponsive. HTN.    Technique: CT axial images of the head were obtained without intravenous   contrast. Computer-reconstructed coronal and sagittal images were   obtained.    Comparison: None.    Findings: Patient motion degrades images at the vertex. There is an acute   intraparenchymal hemorrhage centered in the letitia with surrounding edema   extending to the right brachium pontis and fourth ventricle. There are   small foci of hemorrhage in the right cerebellum. There is effacement of   the prepontine and quadrigeminal cisterns. Nonspecific mild   periventricular and subcortical white matter lucencies may represent   chronic microvascular ischemic changes. There is mild cerebral volume   loss with commensurate ventricular dilatation. The temporal horns appear   mildly prominent.    There is no depressed skull fracture. There is minimal mucosal thickening   of the sinuses. The left tympanomastoid region is clear. There is   nonspecific right mastoid opacification.    Impression:     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 Smaller foci of right   cerebellar hemorrhage. Prominent temporal horns. Early hydrocephalus   cannot be excluded.    < end of copied text >      ADVANCE DIRECTIVES: DNR / no reintubation / no escalation of care. Plan is for palliative extubation HPI:  59  year  old male with PMH of HTN , GERD presented to  ED brought by  911 after being intubated in the [facility] for unresponsiveness and agonal  breathing . patient partner at  the bedside provides the history . patient was watching the basket ball  game and all  of a sudden started moaning and then lost consiousness . patient non complaint with  b. p medications as per the partner at the bedside and was completely normal and had no complaints of chest  pain , shortness of breath  or any  acute symptoms ,    IN the E.D patient vitals are b.p  154/87 and repeat  was 201/108  and hr-122 and intubated. CT head indicates: acute intraparenchymal pontine hemorrhage with surrounding edema, extending to the right brachium pontis and fourth ventricle and effacing the prepontine and quadrigeminal cisterns. Smaller foci of right cerebellar hemorrhage. Prominent temporal horns. Early hydrocephalus cannot be excluded. (09 Apr 2019 03:23)    Patient seen in ICU - remains intubated; grave prognosis. Request to establish goals of care with family. DNR on file.     PAST MEDICAL & SURGICAL HISTORY:  GERD (gastroesophageal reflux disease)  HTN (hypertension)  No significant past surgical history    SOCIAL HISTORY:    Admitted from:  home, lives with partner x 19 years  Substance abuse history: +amphetamine noted in urine             Tobacco hx:    0.5 ppd/day             Alcohol hx:   social           Home Opioid hx: none  Episcopal:     Yazidism                               Preferred Language: english    Surrogate/HCP/Guardian: Jadyn Bill (partner/surrogate): [phone]    FAMILY HISTORY:  No significant family hx reported.     Baseline ADLs (prior to admission): independent, alert/oriented    No Known Allergies    Present Symptoms:       Review of Systems:  Unable to obtain due to poor mentation - patient intubated; minimally responsive to pain, unable to follow commands    MEDICATIONS  (STANDING):  dextrose 50% Injectable 25 Gram(s) IV Push once  insulin lispro (HumaLOG) corrective regimen sliding scale   SubCutaneous every 6 hours  niCARdipine Infusion 5 mG/Hr (25 mL/Hr) IV Continuous <Continuous>  pantoprazole  Injectable 40 milliGRAM(s) IV Push daily    MEDICATIONS  (PRN):  acetaminophen  IVPB .. 1000 milliGRAM(s) IV Intermittent once PRN Temp greater or equal to 38C (100.4F)  glucagon  Injectable 1 milliGRAM(s) IntraMuscular once PRN Glucose <70 milliGRAM(s)/deciLiter      PHYSICAL EXAM:    Vital Signs Last 24 Hrs  T(C): 39.7 (09 Apr 2019 07:30), Max: 39.7 (09 Apr 2019 07:30)  T(F): 103.4 (09 Apr 2019 07:30), Max: 103.4 (09 Apr 2019 07:30)  HR: 106 (09 Apr 2019 12:30) (97 - 122)  BP: 127/80 (09 Apr 2019 12:30) (123/68 - 185/110)  BP(mean): 92 (09 Apr 2019 12:30) (85 - 111)  RR: 17 (09 Apr 2019 12:30) (13 - 37)  SpO2: 99% (09 Apr 2019 12:30) (99% - 100%)    General: patient intubated, no sedation, minimally responsive to pain  Karnofsky Performance Score/Palliative Performance Status Version2:    10 %    HEENT:  dry lips, ET tube. Pupils fixed and dilated; + gag reflex   Lungs: comfortable, on vent. No signs of respiratory distress.   CV:  tachycardia  GI: incontinent; abdomen soft, nontender, non-distended  :  mc  Musculoskeletal: now bedbound, dependent on all ADLs, unable to follow commands;  decorticate posture   Skin: normal     Neuro:   cognitive impairment, patient on no sedation, but minimally responsive to pain, unable to follow commands, fixed pupils  Oral intake ability: unable/only mouth care    Diet: NPO    LABS:                        15.0   14.06 )-----------( 253      ( 09 Apr 2019 05:17 )             46.9     04-09    141  |  107  |  20<H>  ----------------------------<  166<H>  3.6   |  25  |  1.48<H>    Ca    7.9<L>      09 Apr 2019 05:17    TPro  7.5  /  Alb  3.7  /  TBili  0.4  /  DBili  0.1  /  AST  46<H>  /  ALT  30  /  AlkPhos  112  04-09        RADIOLOGY & ADDITIONAL STUDIES:  < from: CT Head No Cont (04.09.19 @ 01:35) >  EXAM:  CT BRAIN                            PROCEDURE DATE:  04/09/2019          INTERPRETATION:  Clinical indication: Unresponsive. HTN.    Technique: CT axial images of the head were obtained without intravenous   contrast. Computer-reconstructed coronal and sagittal images were   obtained.    Comparison: None.    Findings: Patient motion degrades images at the vertex. There is an acute   intraparenchymal hemorrhage centered in the letitia with surrounding edema   extending to the right brachium pontis and fourth ventricle. There are   small foci of hemorrhage in the right cerebellum. There is effacement of   the prepontine and quadrigeminal cisterns. Nonspecific mild   periventricular and subcortical white matter lucencies may represent   chronic microvascular ischemic changes. There is mild cerebral volume   loss with commensurate ventricular dilatation. The temporal horns appear   mildly prominent.    There is no depressed skull fracture. There is minimal mucosal thickening   of the sinuses. The left tympanomastoid region is clear. There is   nonspecific right mastoid opacification.    Impression:     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 Smaller foci of right   cerebellar hemorrhage. Prominent temporal horns. Early hydrocephalus   cannot be excluded.    < end of copied text >      ADVANCE DIRECTIVES: DNR / no reintubation / no escalation of care. Plan is for palliative extubation

## 2019-04-09 NOTE — CONSULT NOTE ADULT - PROBLEM SELECTOR PROBLEM 1
Pontine hemorrhage
Intracerebral hemorrhage
Nontraumatic intracerebral hemorrhage in brainstem, unspecified laterality

## 2019-04-09 NOTE — PROGRESS NOTE ADULT - PROBLEM SELECTOR PLAN 3
IMPROVE score of 2  no  chemical  dvt prophylaxsis secondary  to  brain bleed   c/w scd boots Pt developed fever 0f 102 F likely from intracranial bleeding   WBC count high  no need for blood cultures  Symptomatic tx with IV tylenol, cooling blankets.

## 2019-04-09 NOTE — PROGRESS NOTE ADULT - PROBLEM SELECTOR PLAN 6
Pt DNR/DNI now with no escalation of care   family leaning towards withdrawal of care    service called as per family wishes  Organ donation notified   Organ donation ref no [id number].

## 2019-04-09 NOTE — CONSULT NOTE ADULT - PROBLEM SELECTOR RECOMMENDATION 6
Pt DNR/DNI now with no escalation of care   family leaning towards withdrawal of care    service called as per family wishes  Organ donation notified   Organ donation ref no [id number]

## 2019-04-09 NOTE — CONSULT NOTE ADULT - PROBLEM SELECTOR RECOMMENDATION 4
Met with patient's partner x 19 years/Jadyn Bill ([phone]), aunt/Deborah ([phone]), cousin and her , and patient's daughter; Dr. Rodriguez present. Discussed status. Family acknowledges grave prognosis. Discussed risks vs benefits of resuscitation, intubation. Partner reports, "I don't want to prolong his suffering." Family leaning toward palliative extubation, but awaiting for additional family members to come. Patient now DNR / no reintubation / no escalation of care. All questions answered; supportive counseling provided. Spiritual services offered and accepted - Father Jefe notified. Met with patient's partner x 19 years/Jadyn Bill ([phone]), aunt/Deborah ([phone]), cousin and her , and patient's daughter; Dr. Rodriguez present. Discussed status. Family acknowledges grave prognosis. Discussed risks vs benefits of resuscitation, intubation. Partner reports, "I don't want to prolong his suffering." Family leaning toward palliative extubation, but awaiting for additional family members to come. Patient now DNR / no reintubation / no escalation of care. All questions answered; supportive counseling provided. Spiritual services offered and accepted - Father Jefe notified.    Advance Care Planning Time: +30 minutes

## 2019-04-09 NOTE — H&P ADULT - NSHPPHYSICALEXAM_GEN_ALL_CORE
PHYSICAL EXAM:  GENERAL: intubated   HEAD:  Atraumatic, Normocephalic  EYES: pupils non reactive and dilated   NECK: Supple  CHEST/LUNG: intubated and clear  lungs   HEART: Regular rate and rhythm; No murmurs, rubs, or gallops  ABDOMEN: Soft, Nontender, Nondistended; Bowel sounds present  NERVOUS SYSTEM:  Alert & Oriented X 0 , patient not responsive to verbal  or tactile stimulus and cough reflex present and no  gag reflex , no corneal reflex with myoclonic jerks   EXTREMITIES:  2+ Peripheral Pulses, No clubbing, cyanosis, or edema  SKIN;intact

## 2019-04-09 NOTE — H&P ADULT - NSHPLABSRESULTS_GEN_ALL_CORE
Vital Signs Last 24 Hrs  T(C): 37.1 (09 Apr 2019 01:06), Max: 37.1 (09 Apr 2019 01:06)  T(F): 98.7 (09 Apr 2019 01:06), Max: 98.7 (09 Apr 2019 01:06)  HR: 102 (09 Apr 2019 01:42) (102 - 122)  BP: 158/102 (09 Apr 2019 01:42) (154/87 - 158/102)  BP(mean): --  RR: 14 (09 Apr 2019 01:42) (14 - 17)  SpO2: 100% (09 Apr 2019 01:42) (100% - 100%)        LABS:                        14.9   10.22 )-----------( 264      ( 09 Apr 2019 01:23 )             49.5     04-09    138  |  103  |  19<H>  ----------------------------<  324<H>  3.6   |  22  |  1.78<H>    Ca    7.8<L>      09 Apr 2019 01:23    TPro  7.1  /  Alb  3.4<L>  /  TBili  0.3  /  DBili  x   /  AST  53<H>  /  ALT  25  /  AlkPhos  128<H>  04-09    PT/INR - ( 09 Apr 2019 01:23 )   PT: 10.7 sec;   INR: 0.97 ratio         PTT - ( 09 Apr 2019 01:23 )  PTT:34.6 sec    CAPILLARY BLOOD GLUCOSE      POCT Blood Glucose.: 333 mg/dL (09 Apr 2019 01:08)      RADIOLOGY & ADDITIONAL TESTS:    Imaging Personally Reviewed:  ct  head :  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 Smaller foci of right   cerebellar hemorrhage. Prominent temporal horns. Early hydrocephalus   cannot be excluded.

## 2019-04-09 NOTE — CONSULT NOTE ADULT - PROBLEM SELECTOR RECOMMENDATION 3
s/p pontine hemorrhage. Patient remains intubated; noted with fixed and dilated pupils, +decorticate posturing, +minimally responsive to pain. Grave prognosis.
Pt developed fever 0f 102 F likely from intracranial bleeding   WBC count high  no need for blood cultures  Symptomatic tx with IV tylenol, cooling blankets

## 2019-04-09 NOTE — CONSULT NOTE ADULT - PROBLEM SELECTOR RECOMMENDATION 9
Per CT, patient with acute intraparenchymal pontine hemorrhage with surrounding edema, extending to the right brachium pontis and fourth ventricle and effacing   the prepontine and quadrigeminal cisterns; smaller foci of right cerebellar hemorrhage; prominent temporal horns; early hydrocephalus cannot be excluded. Patient remains intubated; minimally responsive to pain, +pupils fixed and dilated, + decorticate posturing. Family acknowledges grave prognosis - leaning toward palliative extubation but awaiting for more family members to come. DNR / no reintubation / no escalation of care; goal of care is comfort.
pt presented unresponsive   CTH :Acute intraparenchymal pontine hemorrhage with surrounding edema,   extending to the right brachium pontis and fourth ventricle  Utox : positive for amphetamines  likely from uncontrolled HTN   -unsalvagable condition: decorticate posture,comatosed  - c/w nicardipine drip to control BP under 140  -c/w mechanical ventilation  - family aware of the pt's very poor prognosis and unsalvagable condition.   - Family leaning towards withdrawal of care, needs time to discuss with other family members  - no asa / blood thinners  - will monitor BP

## 2019-04-09 NOTE — ED PROVIDER NOTE - CRITICAL CARE PROVIDED
direct patient care (not related to procedure)/consult w/ pt's family directly relating to pts condition/interpretation of diagnostic studies/documentation/additional history taking/consultation with other physicians

## 2019-04-09 NOTE — ED PROVIDER NOTE - PHYSICAL EXAMINATION
Unresponsive. No spontaneous movement. Intubated. Breath sounds bilateral with BVM. Tachycardic S1-S2 with regular rhythm. Pupils 6mm pinpoint and fixed. Healed midline scar.

## 2019-04-09 NOTE — PROGRESS NOTE ADULT - ASSESSMENT
59  year  old male with PMH of HTN , GERD presented to  ED brought by  911 after being intubated in the [facility] for unresponsiveness and agonal  breathing . patient partner at  the bedside provides the history . patient was watching the basket ball  game and all  of a sudden started moaning and then lost consiousness . patient non complaint with  b. 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 Smaller foci of right   cerebellar hemorrhage. Prominent temporal horns. Early hydrocephalus   cannot be excluded. 59  year  old male with PMH of HTN , GERD presented to  ED brought by  911 after being intubated in the [facility] for unresponsiveness and agonal  breathing . patient partner at  the bedside provides the history . patient was watching the basket ball  game and all  of a sudden started moaning and then lost consiousness . patient non complaint with  b. 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 Smaller foci of right   cerebellar hemorrhage. Prominent temporal horns. Early hydrocephalus   cannot be excluded.

## 2019-04-09 NOTE — CONSULT NOTE ADULT - PROBLEM SELECTOR RECOMMENDATION 2
s/p pontine hemorrhage. Patient remains intubated. Family leaning toward palliative extubation, but awaiting for additional family members to come. DNR / no reintubation/ no escalation of care.
BP under 140 now  c/w nicardipine drip   Trop elevated : likely from uncontrolled HTN ,  will monitor

## 2019-04-09 NOTE — H&P ADULT - ASSESSMENT
59  year  old male with PMH of HTN , GERD presented to  ED brought by  911 after being intubated in the [facility] for unresponsiveness and agonal  breathing . patient partner at  the bedside provides the history . patient was watching the basket ball  game and all  of a sudden started moaning and then lost consiousness . patient non complaint with  b. 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 Smaller foci of right   cerebellar hemorrhage. Prominent temporal horns. Early hydrocephalus   cannot be excluded.

## 2019-04-09 NOTE — ED PROVIDER NOTE - CLINICAL SUMMARY MEDICAL DECISION MAKING FREE TEXT BOX
Case dw Dr. Huerta (neuro-ICU at [facility]) pt not accepted for transfer due to unsalvageable condition.    ICU  Attending Dr. White endorsed and will accept.

## 2019-04-09 NOTE — ED PROVIDER NOTE - OBJECTIVE STATEMENT
58 y/o M with PMHx of GERD, HTN on Lisinopril and HCTZ presents to the ED brought in by EMS unresponsive x 1 hour PTA. Patient was found with agonal respirations and sinus tachycardia. Patient was intubated in the field (7.5) for respiratory distress and airway protection without RSI or gag reflex. Fingerstick 180 in the field. History is limited as patient is unresponsive. 60 y/o M with PMHx of GERD, HTN on Lisinopril and HCTZ presents to the ED brought in by EMS unresponsive x 1 hour PTA. Patient was found with agonal respirations and sinus tachycardia. Patient was intubated in the field (7.5) for respiratory distress and airway protection without RSI due to lack of gag reflex. Fingerstick 180 in the field. History is limited as patient is unresponsive.

## 2019-04-09 NOTE — H&P ADULT - PROBLEM SELECTOR PLAN 1
-  likely secondary  to  underlying HTN   - Patient prognosis is poor as patient has loss of reflexes { like gag and also dilated and unreactive pupils with risk  for herniation }  -will c/w nicardipine drip  to  maintain systolic b.p  140    -ct  head reviewed  -f/u  with palliative team in am   - no  chemical  dvt prophylaxsis , c/w scd boots -  likely secondary  to  underlying HTN   - Patient prognosis is poor as patient has loss of reflexes { like gag and also dilated and unreactive pupils with risk  for herniation }  -s/p  intubation for unresponsives   -will c/w nicardipine drip  to  maintain systolic b.p  140    -ct  head reviewed  -f/u  with palliative team in am   - no  chemical  dvt prophylaxsis , c/w scd boots  -f/u  with neurology

## 2019-04-09 NOTE — CONSULT NOTE ADULT - CONSULT REASON
unresponsive
pt with pontine hemorrhage; grave prognosis.
pt with pontine hemorrhage; grave prognosis.

## 2019-04-09 NOTE — PROGRESS NOTE ADULT - SUBJECTIVE AND OBJECTIVE BOX
Patient is a 59y old  Male who presents with a chief complaint of unresponsiveness (09 Apr 2019 03:23)      INTERVAL HPI/OVERNIGHT EVENTS:    MEDICATIONS  (STANDING):  dextrose 50% Injectable 25 Gram(s) IV Push once  insulin lispro (HumaLOG) corrective regimen sliding scale   SubCutaneous every 6 hours  niCARdipine Infusion 5 mG/Hr (25 mL/Hr) IV Continuous <Continuous>  pantoprazole  Injectable 40 milliGRAM(s) IV Push daily    MEDICATIONS  (PRN):  glucagon  Injectable 1 milliGRAM(s) IntraMuscular once PRN Glucose <70 milliGRAM(s)/deciLiter      Allergies    No Known Allergies    Intolerances        REVIEW OF SYSTEMS:  CONSTITUTIONAL: No fever, weight loss, or fatigue  RESPIRATORY: No cough, wheezing, chills or hemoptysis; No shortness of breath  CARDIOVASCULAR: No chest pain, palpitations, dizziness, or leg swelling  GASTROINTESTINAL: No abdominal or epigastric pain. No nausea, vomiting, or hematemesis; No diarrhea or constipation. No melena or hematochezia.  NEUROLOGICAL: No headaches, memory loss, loss of strength, numbness, or tremors  SKIN: No itching, burning, rashes, or lesions     Vital Signs Last 24 Hrs  T(C): 39.2 (09 Apr 2019 05:00), Max: 39.2 (09 Apr 2019 05:00)  T(F): 102.6 (09 Apr 2019 05:00), Max: 102.6 (09 Apr 2019 05:00)  HR: 119 (09 Apr 2019 07:00) (101 - 122)  BP: 157/90 (09 Apr 2019 07:00) (123/68 - 185/110)  BP(mean): 102 (09 Apr 2019 07:00) (86 - 102)  RR: 24 (09 Apr 2019 07:00) (14 - 24)  SpO2: 100% (09 Apr 2019 07:00) (100% - 100%)    PHYSICAL EXAM:  GENERAL: NAD,  HEAD:  Atraumatic, Normocephalic  EYES: EOMI, PERRLA, conjunctiva and sclera clear  NECK: Supple, No JVD, Normal thyroid  CHEST/LUNG: Clear to percussion bilaterally; No rales, rhonchi, wheezing, or rubs  HEART: Regular rate and rhythm; No murmurs, rubs, or gallops  ABDOMEN: Soft, Nontender, Nondistended; Bowel sounds present  NERVOUS SYSTEM:  Alert & Oriented X3, Good concentration; Motor Strength 5/5 B/L   EXTREMITIES:  2+ Peripheral Pulses, No clubbing, cyanosis, or edema  SKIN;    LABS:                        15.0   14.06 )-----------( 253      ( 09 Apr 2019 05:17 )             46.9     04-09    141  |  107  |  20<H>  ----------------------------<  166<H>  3.6   |  25  |  1.48<H>    Ca    7.9<L>      09 Apr 2019 05:17    TPro  7.5  /  Alb  3.7  /  TBili  0.4  /  DBili  0.1  /  AST  46<H>  /  ALT  30  /  AlkPhos  112  04-09    PT/INR - ( 09 Apr 2019 01:23 )   PT: 10.7 sec;   INR: 0.97 ratio         PTT - ( 09 Apr 2019 01:23 )  PTT:34.6 sec    CAPILLARY BLOOD GLUCOSE      POCT Blood Glucose.: 129 mg/dL (09 Apr 2019 06:06)  POCT Blood Glucose.: 333 mg/dL (09 Apr 2019 01:08)      RADIOLOGY & ADDITIONAL TESTS: INTERVAL HPI/OVERNIGHT EVENTS:Pt comatosed , off the sedation, pupils fixed and dilated, decorticate posture, gag reflux present, minimally responsive to pain    PRESSORS: [ ] YES [x ] NO  WHICH:    ANTIBIOTICS:                  DATE STARTED:  ANTIBIOTICS:                  DATE STARTED:  ANTIBIOTICS:                  DATE STARTED:    Other:  acetaminophen  IVPB .. 1000 milliGRAM(s) IV Intermittent once PRN  dextrose 50% Injectable 25 Gram(s) IV Push once  glucagon  Injectable 1 milliGRAM(s) IntraMuscular once PRN  insulin lispro (HumaLOG) corrective regimen sliding scale   SubCutaneous every 6 hours  pantoprazole  Injectable 40 milliGRAM(s) IV Push daily    acetaminophen  IVPB .. 1000 milliGRAM(s) IV Intermittent once PRN  dextrose 50% Injectable 25 Gram(s) IV Push once  glucagon  Injectable 1 milliGRAM(s) IntraMuscular once PRN  insulin lispro (HumaLOG) corrective regimen sliding scale   SubCutaneous every 6 hours  niCARdipine Infusion 5 mG/Hr IV Continuous <Continuous>  pantoprazole  Injectable 40 milliGRAM(s) IV Push daily    Drug Dosing Weight  Height (cm): 157.48 (09 Apr 2019 05:00)  Weight (kg): 72.5 (09 Apr 2019 05:00)  BMI (kg/m2): 29.2 (09 Apr 2019 05:00)  BSA (m2): 1.74 (09 Apr 2019 05:00)    CENTRAL LINE: [ ] YES [x ] NO  LOCATION:   DATE INSERTED:  REMOVE: [ ] YES [ ] NO  EXPLAIN:    CASTILLO: [x ] YES [ ] NO    DATE INSERTED:  REMOVE:  [ ] YES [ ] NO  EXPLAIN:    A-LINE:  [ ] YES [ ] NO  LOCATION:   DATE INSERTED:  REMOVE:  [ ] YES [ ] NO  EXPLAIN:    PMH -reviewed admission note, no change since admission  PAST MEDICAL & SURGICAL HISTORY:  GERD (gastroesophageal reflux disease)  HTN (hypertension)  No significant past surgical history      ICU Vital Signs Last 24 Hrs  T(C): 39.7 (09 Apr 2019 07:30), Max: 39.7 (09 Apr 2019 07:30)  T(F): 103.4 (09 Apr 2019 07:30), Max: 103.4 (09 Apr 2019 07:30)  HR: 106 (09 Apr 2019 12:30) (97 - 122)  BP: 127/80 (09 Apr 2019 12:30) (123/68 - 185/110)  BP(mean): 92 (09 Apr 2019 12:30) (85 - 111)  ABP: --  ABP(mean): --  RR: 17 (09 Apr 2019 12:30) (13 - 37)  SpO2: 99% (09 Apr 2019 12:30) (99% - 100%)      ABG - ( 09 Apr 2019 04:46 )  pH, Arterial: 7.43  pH, Blood: x     /  pCO2: 39    /  pO2: 151   / HCO3: 25    / Base Excess: 1.7   /  SaO2: 99                    04-08 @ 07:01  -  04-09 @ 07:00  --------------------------------------------------------  IN: 22.5 mL / OUT: 1450 mL / NET: -1427.5 mL        Mode: AC/ CMV (Assist Control/ Continuous Mandatory Ventilation)  RR (machine): 15  TV (machine): 500  FiO2: 40  PEEP: 5  ITime: 1  MAP: 10  PIP: 27      PHYSICAL EXAM:  GENERAL: NAD  HEAD: Atraumatic,Normocephalic  EYES: dilated and fixed  NECK: Supple,   NERVOUS SYSTEM: comatosed, decorticate posture, fixed dilated  pupils, gag reflux present, minimally responsive to pain  CHEST/LUNG: No chest deformity; Normal percussion bilaterally No rales, rhonchi, wheezing   HEART: Regular rate and rhythm; No murmurs, rubs, or gallops  ABDOMEN:Soft, Nontender, Nondistended;  EXTREMITIES:  No clubbing, cyanosis, or edema  LYMPH: No lymphadenopathy noted  SKIN: No rashes or lesions;l      LABS:  CBC Full  -  ( 09 Apr 2019 05:17 )  WBC Count : 14.06 K/uL  RBC Count : 5.79 M/uL  Hemoglobin : 15.0 g/dL  Hematocrit : 46.9 %  Platelet Count - Automated : 253 K/uL  Mean Cell Volume : 81.0 fl  Mean Cell Hemoglobin : 25.9 pg  Mean Cell Hemoglobin Concentration : 32.0 gm/dL  Auto Neutrophil # : x  Auto Lymphocyte # : x  Auto Monocyte # : x  Auto Eosinophil # : x  Auto Basophil # : x  Auto Neutrophil % : x  Auto Lymphocyte % : x  Auto Monocyte % : x  Auto Eosinophil % : x  Auto Basophil % : x    04-09    141  |  107  |  20<H>  ----------------------------<  166<H>  3.6   |  25  |  1.48<H>    Ca    7.9<L>      09 Apr 2019 05:17    TPro  7.5  /  Alb  3.7  /  TBili  0.4  /  DBili  0.1  /  AST  46<H>  /  ALT  30  /  AlkPhos  112  04-09    PT/INR - ( 09 Apr 2019 01:23 )   PT: 10.7 sec;   INR: 0.97 ratio         PTT - ( 09 Apr 2019 01:23 )  PTT:34.6 sec        RADIOLOGY & ADDITIONAL STUDIES REVIEWED:  ***    [ ]GOALS OF CARE DISCUSSION WITH PATIENT/FAMILY/PROXY:    CRITICAL CARE TIME SPENT: 35 minutes INTERVAL HPI/OVERNIGHT EVENTS:Pt comatosed , off the sedation, pupils fixed and dilated, decerebrate posture, gag reflux present, minimally responsive to pain    PRESSORS: [ ] YES [x ] NO  WHICH:    ANTIBIOTICS:                  DATE STARTED:  ANTIBIOTICS:                  DATE STARTED:  ANTIBIOTICS:                  DATE STARTED:    Other:  acetaminophen  IVPB .. 1000 milliGRAM(s) IV Intermittent once PRN  dextrose 50% Injectable 25 Gram(s) IV Push once  glucagon  Injectable 1 milliGRAM(s) IntraMuscular once PRN  insulin lispro (HumaLOG) corrective regimen sliding scale   SubCutaneous every 6 hours  pantoprazole  Injectable 40 milliGRAM(s) IV Push daily    acetaminophen  IVPB .. 1000 milliGRAM(s) IV Intermittent once PRN  dextrose 50% Injectable 25 Gram(s) IV Push once  glucagon  Injectable 1 milliGRAM(s) IntraMuscular once PRN  insulin lispro (HumaLOG) corrective regimen sliding scale   SubCutaneous every 6 hours  niCARdipine Infusion 5 mG/Hr IV Continuous <Continuous>  pantoprazole  Injectable 40 milliGRAM(s) IV Push daily    Drug Dosing Weight  Height (cm): 157.48 (09 Apr 2019 05:00)  Weight (kg): 72.5 (09 Apr 2019 05:00)  BMI (kg/m2): 29.2 (09 Apr 2019 05:00)  BSA (m2): 1.74 (09 Apr 2019 05:00)    CENTRAL LINE: [ ] YES [x ] NO  LOCATION:   DATE INSERTED:  REMOVE: [ ] YES [ ] NO  EXPLAIN:    CASTILLO: [x ] YES [ ] NO    DATE INSERTED:  REMOVE:  [ ] YES [ ] NO  EXPLAIN:    A-LINE:  [ ] YES [ ] NO  LOCATION:   DATE INSERTED:  REMOVE:  [ ] YES [ ] NO  EXPLAIN:    PMH -reviewed admission note, no change since admission  PAST MEDICAL & SURGICAL HISTORY:  GERD (gastroesophageal reflux disease)  HTN (hypertension)  No significant past surgical history      ICU Vital Signs Last 24 Hrs  T(C): 39.7 (09 Apr 2019 07:30), Max: 39.7 (09 Apr 2019 07:30)  T(F): 103.4 (09 Apr 2019 07:30), Max: 103.4 (09 Apr 2019 07:30)  HR: 106 (09 Apr 2019 12:30) (97 - 122)  BP: 127/80 (09 Apr 2019 12:30) (123/68 - 185/110)  BP(mean): 92 (09 Apr 2019 12:30) (85 - 111)  ABP: --  ABP(mean): --  RR: 17 (09 Apr 2019 12:30) (13 - 37)  SpO2: 99% (09 Apr 2019 12:30) (99% - 100%)      ABG - ( 09 Apr 2019 04:46 )  pH, Arterial: 7.43  pH, Blood: x     /  pCO2: 39    /  pO2: 151   / HCO3: 25    / Base Excess: 1.7   /  SaO2: 99                    04-08 @ 07:01  -  04-09 @ 07:00  --------------------------------------------------------  IN: 22.5 mL / OUT: 1450 mL / NET: -1427.5 mL        Mode: AC/ CMV (Assist Control/ Continuous Mandatory Ventilation)  RR (machine): 15  TV (machine): 500  FiO2: 40  PEEP: 5  ITime: 1  MAP: 10  PIP: 27      PHYSICAL EXAM:  GENERAL: NAD  HEAD: Atraumatic,Normocephalic  EYES: dilated and fixed  NECK: Supple,   NERVOUS SYSTEM: comatosed, decerebrate posture, fixed dilated  pupils, gag reflux present, minimally responsive to pain  CHEST/LUNG: No chest deformity; Normal percussion bilaterally No rales, rhonchi, wheezing   HEART: Regular rate and rhythm; No murmurs, rubs, or gallops  ABDOMEN:Soft, Nontender, Nondistended;  EXTREMITIES:  No clubbing, cyanosis, or edema  LYMPH: No lymphadenopathy noted  SKIN: No rashes or lesions;l      LABS:  CBC Full  -  ( 09 Apr 2019 05:17 )  WBC Count : 14.06 K/uL  RBC Count : 5.79 M/uL  Hemoglobin : 15.0 g/dL  Hematocrit : 46.9 %  Platelet Count - Automated : 253 K/uL  Mean Cell Volume : 81.0 fl  Mean Cell Hemoglobin : 25.9 pg  Mean Cell Hemoglobin Concentration : 32.0 gm/dL  Auto Neutrophil # : x  Auto Lymphocyte # : x  Auto Monocyte # : x  Auto Eosinophil # : x  Auto Basophil # : x  Auto Neutrophil % : x  Auto Lymphocyte % : x  Auto Monocyte % : x  Auto Eosinophil % : x  Auto Basophil % : x    04-09    141  |  107  |  20<H>  ----------------------------<  166<H>  3.6   |  25  |  1.48<H>    Ca    7.9<L>      09 Apr 2019 05:17    TPro  7.5  /  Alb  3.7  /  TBili  0.4  /  DBili  0.1  /  AST  46<H>  /  ALT  30  /  AlkPhos  112  04-09    PT/INR - ( 09 Apr 2019 01:23 )   PT: 10.7 sec;   INR: 0.97 ratio         PTT - ( 09 Apr 2019 01:23 )  PTT:34.6 sec        RADIOLOGY & ADDITIONAL STUDIES REVIEWED:  ***    [ ]GOALS OF CARE DISCUSSION WITH PATIENT/FAMILY/PROXY:    CRITICAL CARE TIME SPENT: 35 minutes

## 2019-04-09 NOTE — ED ADULT NURSE NOTE - NSIMPLEMENTINTERV_GEN_ALL_ED
Implemented All Fall Risk Interventions:  [place] to call system. Call bell, personal items and telephone within reach. Instruct patient to call for assistance. Room bathroom lighting operational. Non-slip footwear when patient is off stretcher. Physically safe environment: no spills, clutter or unnecessary equipment. Stretcher in lowest position, wheels locked, appropriate side rails in place. Provide visual cue, wrist band, yellow gown, etc. Monitor gait and stability. Monitor for mental status changes and reorient to person, place, and time. Review medications for side effects contributing to fall risk. Reinforce activity limits and safety measures with patient and family.

## 2019-04-10 NOTE — PROGRESS NOTE ADULT - PROBLEM SELECTOR PLAN 1
CTH :Acute intraparenchymal pontine hemorrhage with surrounding edema,   extending to the right brachium pontis and fourth ventricle  Utox : positive for amphetamines  likely from uncontrolled HTN   -unsalvagable condition: decorticate posture,comatosed  - c/w nicardipine drip to control BP under 140  -c/w mechanical ventilation  - family aware of the pt's very poor prognosis and unsalvagable condition.   - Family leaning towards withdrawal of care, needs time to discuss with other family members  - no asa / blood thinners  - will monitor BP. CTH :Acute intraparenchymal pontine hemorrhage with surrounding edema,   extending to the right brachium pontis and fourth ventricle  Utox : positive for amphetamines  likely from uncontrolled HTN   -unsalvagable condition: decorticate posture,comatosed  -c/w mechanical ventilation  - family aware of the pt's very poor prognosis and unsalvagable condition.   -comfort measures for now   - off nicardipine drip, no blood draws   - no asa / blood thinners  - will monitor BP.

## 2019-04-10 NOTE — PROGRESS NOTE ADULT - PROBLEM SELECTOR PLAN 3
Pt developed fever 0f 102 F likely from intracranial bleeding   WBC count high  no need for blood cultures  Symptomatic tx with IV tylenol, cooling blankets. Pt developed fever 0f 103 F likely from intracranial bleeding   no need for blood cultures  Symptomatic tx with IV tylenol, cooling blankets.

## 2019-04-10 NOTE — CHART NOTE - NSCHARTNOTEFT_GEN_A_CORE
Spoke with patient's partner/Jadyn and brother face to face; Dr. Rodriguez present. Updated status - family acknowledges grave prognosis. Family leaning toward palliative extubation for tomorrow; aware patient may benefit from IPU pending status. DNR / no reintubation / no escalation of care on file. All questions answered; supportive counseling provided.

## 2019-04-10 NOTE — PROGRESS NOTE ADULT - PROBLEM SELECTOR PLAN 2
BP under 140 now  c/w nicardipine drip   Trop elevated : likely from uncontrolled HTN ,  will monitor. BP under 140 now  off nicardipine drip   comfort measures only

## 2019-04-10 NOTE — PROGRESS NOTE ADULT - PROBLEM SELECTOR PLAN 6
Pt DNR/DNI now with no escalation of care   family leaning towards withdrawal of care    service called as per family wishes  Organ donation notified   Organ donation ref no [id number]. Pt DNR/DNI now with no escalation of care   family leaning towards withdrawal of care, comfort measures only   withdrawal of care likely tmrw    service called as per family wishes  family refused organ donation.

## 2019-04-10 NOTE — CHART NOTE - NSCHARTNOTEFT_GEN_A_CORE
Pt seen for LOS (ICU) . Intubated. Brain bleed with grave prognosis. Seen and discussed with  Palliative care NP. No escalation care (comfort care") leaning towards palliative extubation. MD to monitor. RD available.

## 2019-04-10 NOTE — PROGRESS NOTE ADULT - PROBLEM SELECTOR PLAN 5
DVT ppx : SCD , chemical ppx contraindicated   GI ppx : protonix. DVT ppx : SCD , chemical ppx contraindicated   GI ppx :off protonix

## 2019-04-10 NOTE — PROGRESS NOTE ADULT - PROBLEM SELECTOR PLAN 4
Cr 1.78 on adm  improved to 1.48  likely from uncontrolled HTN   UO 1100. Cr  1.48  likely from uncontrolled HTN   UO 1100.

## 2019-04-10 NOTE — PROGRESS NOTE ADULT - SUBJECTIVE AND OBJECTIVE BOX
INTERVAL HPI/OVERNIGHT EVENTS: ***    PRESSORS: [ ] YES [ ] NO  WHICH:    ANTIBIOTICS:                  DATE STARTED:  ANTIBIOTICS:                  DATE STARTED:  ANTIBIOTICS:                  DATE STARTED:    Other:  acetaminophen  IVPB .. 1000 milliGRAM(s) IV Intermittent once PRN  chlorhexidine 4% Liquid 1 Application(s) Topical two times a day  dextrose 50% Injectable 25 Gram(s) IV Push once  glucagon  Injectable 1 milliGRAM(s) IntraMuscular once PRN  insulin lispro (HumaLOG) corrective regimen sliding scale   SubCutaneous every 6 hours  pantoprazole  Injectable 40 milliGRAM(s) IV Push daily    acetaminophen  IVPB .. 1000 milliGRAM(s) IV Intermittent once PRN  chlorhexidine 4% Liquid 1 Application(s) Topical two times a day  dextrose 50% Injectable 25 Gram(s) IV Push once  glucagon  Injectable 1 milliGRAM(s) IntraMuscular once PRN  insulin lispro (HumaLOG) corrective regimen sliding scale   SubCutaneous every 6 hours  niCARdipine Infusion 5 mG/Hr IV Continuous <Continuous>  pantoprazole  Injectable 40 milliGRAM(s) IV Push daily    Drug Dosing Weight  Height (cm): 157.48 (09 Apr 2019 05:00)  Weight (kg): 72.5 (09 Apr 2019 05:00)  BMI (kg/m2): 29.2 (09 Apr 2019 05:00)  BSA (m2): 1.74 (09 Apr 2019 05:00)    CENTRAL LINE: [ ] YES [ ] NO  LOCATION:   DATE INSERTED:  REMOVE: [ ] YES [ ] NO  EXPLAIN:    CASTILLO: [ ] YES [ ] NO    DATE INSERTED:  REMOVE:  [ ] YES [ ] NO  EXPLAIN:    A-LINE:  [ ] YES [ ] NO  LOCATION:   DATE INSERTED:  REMOVE:  [ ] YES [ ] NO  EXPLAIN:    PMH -reviewed admission note, no change since admission  PAST MEDICAL & SURGICAL HISTORY:  GERD (gastroesophageal reflux disease)  HTN (hypertension)  No significant past surgical history      ICU Vital Signs Last 24 Hrs  T(C): 32.8 (10 Apr 2019 06:00), Max: 39.6 (10 Apr 2019 00:18)  T(F): 91 (10 Apr 2019 06:00), Max: 103.2 (10 Apr 2019 00:18)  HR: 88 (10 Apr 2019 07:00) (79 - 137)  BP: 132/78 (10 Apr 2019 07:00) (112/72 - 175/94)  BP(mean): 88 (10 Apr 2019 07:00) (79 - 111)  ABP: --  ABP(mean): --  RR: 12 (10 Apr 2019 07:00) (12 - 37)  SpO2: 100% (10 Apr 2019 07:00) (96% - 100%)      ABG - ( 09 Apr 2019 04:46 )  pH, Arterial: 7.43  pH, Blood: x     /  pCO2: 39    /  pO2: 151   / HCO3: 25    / Base Excess: 1.7   /  SaO2: 99                    04-09 @ 07:01  -  04-10 @ 07:00  --------------------------------------------------------  IN: 675 mL / OUT: 1740 mL / NET: -1065 mL        Mode: AC/ CMV (Assist Control/ Continuous Mandatory Ventilation)  RR (machine): 15  TV (machine): 500  FiO2: 40  PEEP: 5  ITime: 1  MAP: 10  PIP: 25      PHYSICAL EXAM:    GENERAL: NAD, well-groomed, well-developed  HEAD: Atraumatic,Normocephalic  EYES: EOMI, PERRLA,conjunctiva and sclera clear  ENMT: No tonsillar erythema, exudates, or enlargement;Moist mucous membranes, Good dentition, No lesions  NECK: Supple, normal appearance, No JVD; Normal thyroid; Trachea midline  NERVOUS SYSTEM: Alert & Oriented X3, Good concentration; Motor Strength 5/5 B/L upper and lower extremities; DTRs 2+ intact and symmetric  CHEST/LUNG: No chest deformity; Normal percussion bilaterally No rales, rhonchi, wheezing   HEART: Regular rate and rhythm; No murmurs, rubs, or gallops  ABDOMEN:Soft, Nontender, Nondistended;Bowel sounds present  EXTREMITIES:  2+ Peripheral Pulses, No clubbing, cyanosis, or edema  LYMPH: No lymphadenopathy noted  SKIN: No rashes or lesions; Good capillary refill      LABS:  CBC Full  -  ( 10 Apr 2019 06:21 )  WBC Count : 12.50 K/uL  RBC Count : 6.18 M/uL  Hemoglobin : 15.9 g/dL  Hematocrit : 50.9 %  Platelet Count - Automated : 246 K/uL  Mean Cell Volume : 82.4 fl  Mean Cell Hemoglobin : 25.7 pg  Mean Cell Hemoglobin Concentration : 31.2 gm/dL  Auto Neutrophil # : 10.58 K/uL  Auto Lymphocyte # : 0.84 K/uL  Auto Monocyte # : 0.98 K/uL  Auto Eosinophil # : 0.00 K/uL  Auto Basophil # : 0.03 K/uL  Auto Neutrophil % : 84.7 %  Auto Lymphocyte % : 6.7 %  Auto Monocyte % : 7.8 %  Auto Eosinophil % : 0.0 %  Auto Basophil % : 0.2 %    04-10    146<H>  |  112<H>  |  30<H>  ----------------------------<  190<H>  3.7   |  22  |  1.45<H>    Ca    8.4      10 Apr 2019 06:21  Phos  4.4     04-10  Mg     2.5     04-10    TPro  7.5  /  Alb  3.7  /  TBili  0.4  /  DBili  0.1  /  AST  46<H>  /  ALT  30  /  AlkPhos  112  04-09    PT/INR - ( 09 Apr 2019 01:23 )   PT: 10.7 sec;   INR: 0.97 ratio         PTT - ( 09 Apr 2019 01:23 )  PTT:34.6 sec        RADIOLOGY & ADDITIONAL STUDIES REVIEWED:  ***    [ ]GOALS OF CARE DISCUSSION WITH PATIENT/FAMILY/PROXY:    CRITICAL CARE TIME SPENT: 35 minutes INTERVAL HPI/OVERNIGHT EVENTS:Pt still comatosed , off the sedation, pupils fixed and dilated, decorticate posture, gag reflux present, minimally responsive to pain, clinically unchanged from yesterday    PRESSORS: [ ] YES [x ] NO  WHICH:    ANTIBIOTICS:                  DATE STARTED:  ANTIBIOTICS:                  DATE STARTED:  ANTIBIOTICS:                  DATE STARTED:    Other:  MEDICATIONS  (STANDING):  chlorhexidine 4% Liquid 1 Application(s) Topical two times a day    MEDICATIONS  (PRN):  acetaminophen  IVPB .. 1000 milliGRAM(s) IV Intermittent once PRN Temp greater or equal to 38C (100.4F)    Drug Dosing Weight  Height (cm): 157.48 (09 Apr 2019 05:00)  Weight (kg): 72.5 (09 Apr 2019 05:00)  BMI (kg/m2): 29.2 (09 Apr 2019 05:00)  BSA (m2): 1.74 (09 Apr 2019 05:00)    CENTRAL LINE: [ ] YES [x ] NO  LOCATION:   DATE INSERTED:  REMOVE: [ ] YES [ ] NO  EXPLAIN:    CASTILLO: [x ] YES [ ] NO    DATE INSERTED:  REMOVE:  [ ] YES [ ] NO  EXPLAIN:    A-LINE:  [ ] YES [ ] NO  LOCATION:   DATE INSERTED:  REMOVE:  [ ] YES [ ] NO  EXPLAIN:    PMH -reviewed admission note, no change since admission  PAST MEDICAL & SURGICAL HISTORY:  GERD (gastroesophageal reflux disease)  HTN (hypertension)  No significant past surgical history      ICU Vital Signs Last 24 Hrs  T(C): 37.7 (10 Apr 2019 10:00), Max: 39.6 (10 Apr 2019 00:18)  T(F): 99.9 (10 Apr 2019 10:00), Max: 103.2 (10 Apr 2019 00:18)  HR: 111 (10 Apr 2019 10:00) (79 - 137)  BP: 150/85 (10 Apr 2019 10:00) (112/72 - 169/96)  BP(mean): 99 (10 Apr 2019 10:00) (79 - 110)  ABP: --  ABP(mean): --  RR: 18 (10 Apr 2019 10:00) (12 - 23)  SpO2: 100% (10 Apr 2019 10:00) (96% - 100%)        ABG - ( 09 Apr 2019 04:46 )  pH, Arterial: 7.43  pH, Blood: x     /  pCO2: 39    /  pO2: 151   / HCO3: 25    / Base Excess: 1.7   /  SaO2: 99        Mode: AC/ CMV (Assist Control/ Continuous Mandatory Ventilation)  RR (machine): 15  TV (machine): 500  FiO2: 40  PEEP: 5  ITime: 1  MAP: 10  PIP: 27      PHYSICAL EXAM:  GENERAL: NAD  HEAD: Atraumatic,Normocephalic  EYES: dilated and fixed  NECK: Supple,   NERVOUS SYSTEM: comatosed, decorticate posture, fixed dilated  pupils, gag reflux present, minimally responsive to pain  CHEST/LUNG: No chest deformity; Normal percussion bilaterally No rales, rhonchi, wheezing   HEART: Regular rate and rhythm; No murmurs, rubs, or gallops  ABDOMEN:Soft, Nontender, Nondistended;  EXTREMITIES:  No clubbing, cyanosis, or edema  LYMPH: No lymphadenopathy noted  SKIN: No rashes or lesions;                            15.9   12.50 )-----------( 246      ( 10 Apr 2019 06:21 )             50.9   04-10    146<H>  |  112<H>  |  30<H>  ----------------------------<  190<H>  3.7   |  22  |  1.45<H>    Ca    8.4      10 Apr 2019 06:21  Phos  4.4     04-10  Mg     2.5     04-10    TPro  7.5  /  Alb  3.7  /  TBili  0.4  /  DBili  0.1  /  AST  46<H>  /  ALT  30  /  AlkPhos  112  04-09          RADIOLOGY & ADDITIONAL STUDIES REVIEWED:  ***    [ ]GOALS OF CARE DISCUSSION WITH PATIENT/FAMILY/PROXY:    CRITICAL CARE TIME SPENT: 35 minutes INTERVAL HPI/OVERNIGHT EVENTS:Pt still comatosed , off the sedation, pupils fixed and dilated, decerebrate posture, gag reflux present, minimally responsive to pain, clinically unchanged from yesterday    PRESSORS: [ ] YES [x ] NO  WHICH:    ANTIBIOTICS:                  DATE STARTED:  ANTIBIOTICS:                  DATE STARTED:  ANTIBIOTICS:                  DATE STARTED:    Other:  MEDICATIONS  (STANDING):  chlorhexidine 4% Liquid 1 Application(s) Topical two times a day    MEDICATIONS  (PRN):  acetaminophen  IVPB .. 1000 milliGRAM(s) IV Intermittent once PRN Temp greater or equal to 38C (100.4F)    Drug Dosing Weight  Height (cm): 157.48 (09 Apr 2019 05:00)  Weight (kg): 72.5 (09 Apr 2019 05:00)  BMI (kg/m2): 29.2 (09 Apr 2019 05:00)  BSA (m2): 1.74 (09 Apr 2019 05:00)    CENTRAL LINE: [ ] YES [x ] NO  LOCATION:   DATE INSERTED:  REMOVE: [ ] YES [ ] NO  EXPLAIN:    CASTILLO: [x ] YES [ ] NO    DATE INSERTED:  REMOVE:  [ ] YES [ ] NO  EXPLAIN:    A-LINE:  [ ] YES [ ] NO  LOCATION:   DATE INSERTED:  REMOVE:  [ ] YES [ ] NO  EXPLAIN:    PMH -reviewed admission note, no change since admission  PAST MEDICAL & SURGICAL HISTORY:  GERD (gastroesophageal reflux disease)  HTN (hypertension)  No significant past surgical history      ICU Vital Signs Last 24 Hrs  T(C): 37.7 (10 Apr 2019 10:00), Max: 39.6 (10 Apr 2019 00:18)  T(F): 99.9 (10 Apr 2019 10:00), Max: 103.2 (10 Apr 2019 00:18)  HR: 111 (10 Apr 2019 10:00) (79 - 137)  BP: 150/85 (10 Apr 2019 10:00) (112/72 - 169/96)  BP(mean): 99 (10 Apr 2019 10:00) (79 - 110)  ABP: --  ABP(mean): --  RR: 18 (10 Apr 2019 10:00) (12 - 23)  SpO2: 100% (10 Apr 2019 10:00) (96% - 100%)        ABG - ( 09 Apr 2019 04:46 )  pH, Arterial: 7.43  pH, Blood: x     /  pCO2: 39    /  pO2: 151   / HCO3: 25    / Base Excess: 1.7   /  SaO2: 99        Mode: AC/ CMV (Assist Control/ Continuous Mandatory Ventilation)  RR (machine): 15  TV (machine): 500  FiO2: 40  PEEP: 5  ITime: 1  MAP: 10  PIP: 27      PHYSICAL EXAM:  GENERAL: NAD  HEAD: Atraumatic,Normocephalic  EYES: dilated and fixed  NECK: Supple,   NERVOUS SYSTEM: comatosed, decerebrate posture, fixed dilated  pupils, gag reflux present, minimally responsive to pain  CHEST/LUNG: No chest deformity; Normal percussion bilaterally No rales, rhonchi, wheezing   HEART: Regular rate and rhythm; No murmurs, rubs, or gallops  ABDOMEN:Soft, Nontender, Nondistended;  EXTREMITIES:  No clubbing, cyanosis, or edema  LYMPH: No lymphadenopathy noted  SKIN: No rashes or lesions;                            15.9   12.50 )-----------( 246      ( 10 Apr 2019 06:21 )             50.9   04-10    146<H>  |  112<H>  |  30<H>  ----------------------------<  190<H>  3.7   |  22  |  1.45<H>    Ca    8.4      10 Apr 2019 06:21  Phos  4.4     04-10  Mg     2.5     04-10    TPro  7.5  /  Alb  3.7  /  TBili  0.4  /  DBili  0.1  /  AST  46<H>  /  ALT  30  /  AlkPhos  112  04-09          RADIOLOGY & ADDITIONAL STUDIES REVIEWED:  ***    [ ]GOALS OF CARE DISCUSSION WITH PATIENT/FAMILY/PROXY:    CRITICAL CARE TIME SPENT: 35 minutes

## 2019-04-10 NOTE — PROGRESS NOTE ADULT - ASSESSMENT
59  year  old male with PMH of HTN , GERD presented to  ED brought by  911 after being intubated in the [facility] for unresponsiveness and agonal  breathing . patient partner at  the bedside provides the history . patient was watching the basket ball  game and all  of a sudden started moaning and then lost consiousness . patient non complaint with  b. 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 Smaller foci of right   cerebellar hemorrhage. Prominent temporal horns. Early hydrocephalus   cannot be excluded.

## 2019-04-11 NOTE — PROGRESS NOTE ADULT - ASSESSMENT
59  year  old male with PMH of HTN , GERD presented to  ED brought by  911 after being intubated in the [facility] for unresponsiveness and agonal  breathing . patient partner at  the bedside provides the history . patient was watching the basket ball  game and all  of a sudden started moaning and then lost consiousness . patient non complaint with  b. 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 Smaller foci of right   cerebellar hemorrhage. Prominent temporal horns. Early hydrocephalus   cannot be excluded.

## 2019-04-11 NOTE — PROGRESS NOTE ADULT - SUBJECTIVE AND OBJECTIVE BOX
INTERVAL HPI/OVERNIGHT EVENTS:Pt still comatosed , off the sedation, pupils fixed and dilated, decerebrate posture, gag reflux present, minimally responsive to pain, clinically unchanged from yesterday    PRESSORS: [ ] YES [x ] NO  WHICH:    ANTIBIOTICS:                  DATE STARTED:  ANTIBIOTICS:                  DATE STARTED:  ANTIBIOTICS:                  DATE STARTED:    Other:  MEDICATIONS  (STANDING):  chlorhexidine 4% Liquid 1 Application(s) Topical two times a day    MEDICATIONS  (PRN):  acetaminophen  IVPB .. 1000 milliGRAM(s) IV Intermittent once PRN Temp greater or equal to 38C (100.4F)    Drug Dosing Weight  Height (cm): 157.48 (09 Apr 2019 05:00)  Weight (kg): 72.5 (09 Apr 2019 05:00)  BMI (kg/m2): 29.2 (09 Apr 2019 05:00)  BSA (m2): 1.74 (09 Apr 2019 05:00)    CENTRAL LINE: [ ] YES [x ] NO  LOCATION:   DATE INSERTED:  REMOVE: [ ] YES [ ] NO  EXPLAIN:    CASTILLO: [x ] YES [ ] NO    DATE INSERTED:  REMOVE:  [ ] YES [ ] NO  EXPLAIN:    A-LINE:  [ ] YES [ ] NO  LOCATION:   DATE INSERTED:  REMOVE:  [ ] YES [ ] NO  EXPLAIN:    PMH -reviewed admission note, no change since admission  PAST MEDICAL & SURGICAL HISTORY:  GERD (gastroesophageal reflux disease)  HTN (hypertension)  No significant past surgical history      ICU Vital Signs Last 24 Hrs  T(C): 37.7 (10 Apr 2019 10:00), Max: 39.6 (10 Apr 2019 00:18)  T(F): 99.9 (10 Apr 2019 10:00), Max: 103.2 (10 Apr 2019 00:18)  HR: 111 (10 Apr 2019 10:00) (79 - 137)  BP: 150/85 (10 Apr 2019 10:00) (112/72 - 169/96)  BP(mean): 99 (10 Apr 2019 10:00) (79 - 110)  ABP: --  ABP(mean): --  RR: 18 (10 Apr 2019 10:00) (12 - 23)  SpO2: 100% (10 Apr 2019 10:00) (96% - 100%)        ABG - ( 09 Apr 2019 04:46 )  pH, Arterial: 7.43  pH, Blood: x     /  pCO2: 39    /  pO2: 151   / HCO3: 25    / Base Excess: 1.7   /  SaO2: 99        Mode: AC/ CMV (Assist Control/ Continuous Mandatory Ventilation)  RR (machine): 15  TV (machine): 500  FiO2: 40  PEEP: 5  ITime: 1  MAP: 10  PIP: 27      PHYSICAL EXAM:  GENERAL: NAD  HEAD: Atraumatic,Normocephalic  EYES: dilated and fixed  NECK: Supple,   NERVOUS SYSTEM: comatosed, decerebrate posture, fixed dilated  pupils, gag reflux present, minimally responsive to pain  CHEST/LUNG: No chest deformity; Normal percussion bilaterally No rales, rhonchi, wheezing   HEART: Regular rate and rhythm; No murmurs, rubs, or gallops  ABDOMEN:Soft, Nontender, Nondistended;  EXTREMITIES:  No clubbing, cyanosis, or edema  LYMPH: No lymphadenopathy noted  SKIN: No rashes or lesions;                            15.9   12.50 )-----------( 246      ( 10 Apr 2019 06:21 )             50.9   04-10    146<H>  |  112<H>  |  30<H>  ----------------------------<  190<H>  3.7   |  22  |  1.45<H>    Ca    8.4      10 Apr 2019 06:21  Phos  4.4     04-10  Mg     2.5     04-10    TPro  7.5  /  Alb  3.7  /  TBili  0.4  /  DBili  0.1  /  AST  46<H>  /  ALT  30  /  AlkPhos  112  04-09          RADIOLOGY & ADDITIONAL STUDIES REVIEWED:  ***    [ ]GOALS OF CARE DISCUSSION WITH PATIENT/FAMILY/PROXY:    CRITICAL CARE TIME SPENT: 35 minutes

## 2019-04-11 NOTE — PROGRESS NOTE ADULT - PROBLEM SELECTOR PROBLEM 4
DEBI (acute kidney injury)
Palliative care encounter
DEBI (acute kidney injury)
DEBI (acute kidney injury)

## 2019-04-11 NOTE — PROGRESS NOTE ADULT - PROBLEM SELECTOR PLAN 1
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   -  services take today as Pt will be TERMINALLY EXTUBATED today  -comfort measures only  - pain management and O2 as needed

## 2019-04-11 NOTE — PROGRESS NOTE ADULT - PROBLEM SELECTOR PLAN 3
Pt developed fever 0f 89 F likely from intracranial bleeding   no need for blood cultures  Symptomatic tx with IV tylenol, cooling blankets. Symptomatic tx with IV tylenol, cooling blankets.

## 2019-04-11 NOTE — PROGRESS NOTE ADULT - PROBLEM SELECTOR PLAN 2
s/p pontine hemorrhage. Patient s/p palliative extubation; now on NRB. Recommend switching to NC to enhance comfort. PRN morphine and robinul available for sx management. DNR / no reintubation; goal of care is comfort.

## 2019-04-11 NOTE — PROGRESS NOTE ADULT - PROBLEM SELECTOR PLAN 6
Pt DNR/DNI now with no escalation of care   family leaning towards withdrawal of care, comfort measures only   withdrawal of care likely tmrw    service called as per family wishes  family refused organ donation.

## 2019-04-11 NOTE — DISCHARGE NOTE FOR THE EXPIRED PATIENT - HOSPITAL COURSE
59  year  old male with PMH of HTN , GERD presented to  ED brought by  911 after being intubated in the field for unresponsiveness and agonal  breathing . patient partner at  the bedside provides the history . patient was watching the basket ball  game and all  of a sudden started moaning and then lost consciousness . patient non complaint with  b. 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 Smaller foci of right   cerebellar hemorrhage. Prominent temporal horns. Early hydrocephalus   cannot be excluded.  ED doctor discussed Case wtih Dr. Huerta (neuro-ICU at [facility]) pt not accepted for transfer due to unsalvageable condition.  Pt was admitted to ICU for pontine hemorrhage and uncontrolled hypertension  Pt's pupils were dilated and fixed, de cerebrate posture with minimally response to pain. Pt was placed on ventilator and started on nicardipine drip for HTN.   Unsalvageable condition of the pt was discussed with the family and made aware of the grave prognosis. Palliative care was on board. After discussion with all the family members btw themselves they made the decision to withdraw care.  services were offered to the pt    Pt was terminally extubated today and placed on comfort care.   Family was at bedside. Pt  on 2019 at 16:31 based on cardiopulmonary criteria.     Family doesnt want any autopsy. .

## 2019-04-11 NOTE — PROGRESS NOTE ADULT - PROBLEM SELECTOR PLAN 1
Per CT, patient with acute intraparenchymal pontine hemorrhage with surrounding edema, extending to the right brachium pontis and fourth ventricle and effacing   the prepontine and quadrigeminal cisterns; smaller foci of right cerebellar hemorrhage; prominent temporal horns; early hydrocephalus cannot be excluded. Patient s/p palliative extubation; +pupils fixed and dilated. Goal of care is comfort.

## 2019-04-11 NOTE — PROGRESS NOTE ADULT - SUBJECTIVE AND OBJECTIVE BOX
OVERNIGHT EVENTS: s/p palliative extubation     Present Symptoms:   Review of Systems: Unable to obtain due to poor mentation    MEDICATIONS  (STANDING):  chlorhexidine 4% Liquid 1 Application(s) Topical two times a day    MEDICATIONS  (PRN):  acetaminophen  Suppository .. 650 milliGRAM(s) Rectal every 6 hours PRN Temp greater or equal to 38C (100.4F)  bisacodyl Suppository 10 milliGRAM(s) Rectal daily PRN Constipation  glycopyrrolate Injectable 0.4 milliGRAM(s) IV Push every 6 hours PRN secretions  LORazepam   Injectable 2 milliGRAM(s) IV Push every 4 hours PRN Agitation  morphine  - Injectable 2 milliGRAM(s) IV Push every 1 hour PRN respiratory distress, labored breathing      PHYSICAL EXAM:  Vital Signs Last 24 Hrs  T(C): 36.2 (11 Apr 2019 15:21), Max: 39.3 (10 Apr 2019 23:30)  T(F): 97.1 (11 Apr 2019 15:21), Max: 102.7 (10 Apr 2019 23:30)  HR: 101 (11 Apr 2019 14:00) (92 - 131)  BP: 98/58 (11 Apr 2019 14:00) (98/58 - 188/93)  BP(mean): 68 (11 Apr 2019 14:00) (68 - 117)  RR: 23 (11 Apr 2019 14:00) (14 - 24)  SpO2: 99% (11 Apr 2019 14:00) (86% - 100%)  General: patient not responsive to pain, + decerebrate posturing  Karnofsky Performance Score/Palliative Performance Status Version2:    10 %    HEENT:  dry lips, ET tube. Pupils fixed and dilated; + gag reflex   Lungs: comfortable, on NRB.  on vent. No signs of respiratory distress.   CV:  normal  GI: incontinent; abdomen soft, nontender, non-distended  :  mc  Musculoskeletal: now bedbound, dependent on all ADLs, unable to follow commands; + decerebrate posturing  Skin: normal     Neuro:   cognitive impairment, patient not responsive to pain, unable to follow commands, fixed and dilated pupils  Oral intake ability: unable/only mouth care    Diet: NPO    LABS:                          15.9   12.50 )-----------( 246      ( 10 Apr 2019 06:21 )             50.9     04-10    146<H>  |  112<H>  |  30<H>  ----------------------------<  190<H>  3.7   |  22  |  1.45<H>    Ca    8.4      10 Apr 2019 06:21  Phos  4.4     04-10  Mg     2.5     04-10          RADIOLOGY & ADDITIONAL STUDIES: reviewed    ADVANCE DIRECTIVES: DNR / DNI / no escalation of care. Family requested palliative extubation today.

## 2019-04-11 NOTE — PROGRESS NOTE ADULT - ATTENDING COMMENTS
Patient seen and examined with resident, Addendum to above.    59  year  old male with PMH of HTN , GERD admitted with pontine hemorrhage. Not accepted to neuro ICU as this is a catastrophic event.     Assessment:  1. Acute pontine hemorrhage - Neurochecks. Seizure precautions. Neurology consult appreciated. Control fevers.  2. Acute respiratory failure - cont. Ventilatory support  3. DEBI - Likely prerenal. Now resolving.   4. Hypertension - Symptom management only at this time.     - Withdrawal of care today. Refer patient to hospice.
Patient seen and examined with resident, Addendum to above.    59  year  old male with PMH of HTN , GERD admitted with pontine hemorrhage. Not accepted to neuro ICU as this is a catastrophic event.     Assessment:  1. Acute pontine hemorrhage - Neurochecks. Seizure precautions. Neurology consult appreciated. Control fevers and glucose levels  2. Acute respiratory failure - cont. Ventilatory support  3. DEBI - Likely prerenal. Now resolving.   4. Hypertension - On nicardipine infusion to maintain BP < 140    - At this time family has opted for no escalation of care. Patient has some brain stem reflexes intact as he breaths over the set ventilatory rate and has a cough reflex. Fevers likely associated to ICH rather than an acute infectious process.   - Palliative care consulted.  - 35 min of critical care time spent on this patient's care
Patient seen and examined with resident, Addendum to above.    59  year  old male with PMH of HTN , GERD admitted with pontine hemorrhage. Not accepted to neuro ICU as this is a catastrophic event.     Assessment:  1. Acute pontine hemorrhage - Neurochecks. Seizure precautions. Neurology consult appreciated. Control fevers and glucose levels  2. Acute respiratory failure - cont. Ventilatory support  3. DEBI - Likely prerenal. Now resolving.   4. Hypertension - Symptom management only at this time.     - At this time family has opted for no escalation of care. Patient has some brain stem reflexes intact as he breaths over the set ventilatory rate and has a cough reflex. Fevers likely associated to ICH rather than an acute infectious process.   - Palliative care consult appreciated. Possible withdrawal of care, awaiting family members  - 34 min of critical care time spent on this patient's care

## 2019-04-11 NOTE — PROGRESS NOTE ADULT - PROBLEM SELECTOR PLAN 4
Cr  1.48  likely from uncontrolled HTN   UO 1100. Cr  1.48  likely from uncontrolled HTN   UO 1100.  comfort measures only

## 2019-04-11 NOTE — PROGRESS NOTE ADULT - PROBLEM SELECTOR PLAN 4
Family requested palliative extubation earlier today. Patient appears to be actively dying. PRN comfort medications available for sx management.

## 2019-04-11 NOTE — PROGRESS NOTE ADULT - PROBLEM SELECTOR PLAN 5
DVT ppx : SCD , chemical ppx contraindicated   GI ppx :off protonix Pt DNR/DNI now with no escalation of care   TERMINAL EXTUBATION TODAY  Supportive care only   service called as per family wishes  family refused organ donation.

## 2019-04-11 NOTE — PROGRESS NOTE ADULT - PROBLEM SELECTOR PLAN 3
s/p pontine hemorrhage. Patient was palliatively extubated earlier today; now on NRB, pupils remain fixed and dilated. Grave prognosis.

## 2025-01-07 NOTE — ED ADULT TRIAGE NOTE - AS HEIGHT TYPE
Copied from CRM #[id number]. Topic: MW Referral/Order - MW Referral/Order Request  >> Tommy 3, 2025  2:28 PM Toya EL wrote:  [facility] called regarding a Referral (or Service to Order).      New referral/ service-to order requested discussed previously with provider; Selected 'Wrap Up CRM' and created new Telephone Encounter after clicking 'Convert to Clinical Call'. Selected reason for call 'Referral'. Sent Referral message and routed as routine priority per Clinician KB page to appropriate clinician Pool.  -- DO NOT REPLY / DO NOT REPLY ALL --  -- This inbox is not monitored. If this was sent to the wrong provider or department, reroute message to P ECO Reroute pool. --  -- Message is from Engagement Center Operations ([facility]) --        Referral Request  Name of Specialist: Dr. Mikal Ahmadi   Provider's specialty: Ophthalmology    Medical condition for referral:  double vision     Is this a NEW request?: yes      Referral ordered by: n/a      Insurance type: Payor:  [employer] / Plan:  BE KILTRA 076/053 / Product Type:  MEDICARE ADVANTAGE      Payor:  [employer] MEDICARE [employer]VANTAGE / Plan:  BE /[id number] / Product Type:  MEDICARE ADVANTAGE    Preferred Delivery Method  Call when ready for pickup - phone number to notify: [phone] and Fax - number to send to: [phone]    Caller Information       Contact Date/Time Type Contact Phone/Fax    01/03/2025 02:26 PM CST Phone (Incoming) [facility] [phone]            Alternative phone number: n/a    Can a detailed message be left?  Yes - Voicemail     Patient has been advised the message will be addressed within 2-3 business days        Dr Mikal Ahmadi is not in the [facility] network.  Attempted to contact patient's spouse however there was no answer and no vm.   Referral made and faxed over pt aware.    stated 22-Aug-2023